# Patient Record
Sex: MALE | Race: WHITE | NOT HISPANIC OR LATINO | Employment: FULL TIME | URBAN - METROPOLITAN AREA
[De-identification: names, ages, dates, MRNs, and addresses within clinical notes are randomized per-mention and may not be internally consistent; named-entity substitution may affect disease eponyms.]

---

## 2021-10-06 ENCOUNTER — APPOINTMENT (OUTPATIENT)
Dept: RADIOLOGY | Facility: CLINIC | Age: 61
End: 2021-10-06
Payer: COMMERCIAL

## 2021-10-06 VITALS
BODY MASS INDEX: 27.92 KG/M2 | HEIGHT: 70 IN | SYSTOLIC BLOOD PRESSURE: 147 MMHG | HEART RATE: 74 BPM | WEIGHT: 195 LBS | DIASTOLIC BLOOD PRESSURE: 80 MMHG

## 2021-10-06 DIAGNOSIS — M51.16 LUMBAR DISC HERNIATION WITH RADICULOPATHY: ICD-10-CM

## 2021-10-06 DIAGNOSIS — M54.50 LOW BACK PAIN, UNSPECIFIED BACK PAIN LATERALITY, UNSPECIFIED CHRONICITY, UNSPECIFIED WHETHER SCIATICA PRESENT: ICD-10-CM

## 2021-10-06 DIAGNOSIS — M54.16 ACUTE RIGHT LUMBAR RADICULOPATHY: Primary | ICD-10-CM

## 2021-10-06 PROCEDURE — 99204 OFFICE O/P NEW MOD 45 MIN: CPT | Performed by: ORTHOPAEDIC SURGERY

## 2021-10-06 PROCEDURE — 72100 X-RAY EXAM L-S SPINE 2/3 VWS: CPT

## 2021-10-06 RX ORDER — ROSUVASTATIN CALCIUM 20 MG/1
20 TABLET, COATED ORAL
COMMUNITY
Start: 2021-09-24

## 2021-10-06 RX ORDER — ACETAMINOPHEN/DIPHENHYDRAMINE 500MG-25MG
81 TABLET ORAL DAILY
COMMUNITY
Start: 2021-09-24

## 2021-11-03 ENCOUNTER — APPOINTMENT (OUTPATIENT)
Dept: RADIOLOGY | Facility: CLINIC | Age: 61
End: 2021-11-03
Payer: COMMERCIAL

## 2021-11-03 VITALS — TEMPERATURE: 98.4 F

## 2021-11-03 DIAGNOSIS — M25.511 ACUTE PAIN OF RIGHT SHOULDER: ICD-10-CM

## 2021-11-03 DIAGNOSIS — M54.16 ACUTE RIGHT LUMBAR RADICULOPATHY: Primary | ICD-10-CM

## 2021-11-03 PROCEDURE — 99214 OFFICE O/P EST MOD 30 MIN: CPT | Performed by: ORTHOPAEDIC SURGERY

## 2021-11-03 PROCEDURE — 20610 DRAIN/INJ JOINT/BURSA W/O US: CPT | Performed by: ORTHOPAEDIC SURGERY

## 2021-11-03 PROCEDURE — 73030 X-RAY EXAM OF SHOULDER: CPT

## 2021-11-03 RX ORDER — PREDNISONE 20 MG/1
TABLET ORAL
Status: ON HOLD | COMMUNITY
Start: 2021-09-24 | End: 2021-11-18 | Stop reason: ALTCHOICE

## 2021-11-03 RX ORDER — DEXAMETHASONE SODIUM PHOSPHATE 100 MG/10ML
40 INJECTION INTRAMUSCULAR; INTRAVENOUS
Status: COMPLETED | OUTPATIENT
Start: 2021-11-03 | End: 2021-11-03

## 2021-11-03 RX ORDER — LIDOCAINE HYDROCHLORIDE 10 MG/ML
4 INJECTION, SOLUTION INFILTRATION; PERINEURAL
Status: COMPLETED | OUTPATIENT
Start: 2021-11-03 | End: 2021-11-03

## 2021-11-03 RX ADMIN — LIDOCAINE HYDROCHLORIDE 4 ML: 10 INJECTION, SOLUTION INFILTRATION; PERINEURAL at 15:32

## 2021-11-03 RX ADMIN — DEXAMETHASONE SODIUM PHOSPHATE 40 MG: 100 INJECTION INTRAMUSCULAR; INTRAVENOUS at 15:32

## 2021-11-10 VITALS
TEMPERATURE: 98 F | WEIGHT: 195 LBS | BODY MASS INDEX: 27.92 KG/M2 | HEART RATE: 76 BPM | DIASTOLIC BLOOD PRESSURE: 87 MMHG | SYSTOLIC BLOOD PRESSURE: 129 MMHG | HEIGHT: 70 IN

## 2021-11-10 DIAGNOSIS — S46.011A TRAUMATIC COMPLETE TEAR OF RIGHT ROTATOR CUFF, INITIAL ENCOUNTER: Primary | ICD-10-CM

## 2021-11-10 DIAGNOSIS — Z01.812 ENCOUNTER FOR PRE-OPERATIVE LABORATORY TESTING: ICD-10-CM

## 2021-11-10 PROCEDURE — 99214 OFFICE O/P EST MOD 30 MIN: CPT | Performed by: ORTHOPAEDIC SURGERY

## 2021-11-10 RX ORDER — TRAMADOL HYDROCHLORIDE 50 MG/1
50 TABLET ORAL EVERY 6 HOURS PRN
Status: CANCELLED | OUTPATIENT
Start: 2021-11-10

## 2021-11-12 ENCOUNTER — APPOINTMENT (OUTPATIENT)
Dept: LAB | Facility: HOSPITAL | Age: 61
End: 2021-11-12
Attending: ORTHOPAEDIC SURGERY
Payer: COMMERCIAL

## 2021-11-12 DIAGNOSIS — Z01.812 ENCOUNTER FOR PRE-OPERATIVE LABORATORY TESTING: ICD-10-CM

## 2021-11-12 DIAGNOSIS — S46.011A TRAUMATIC COMPLETE TEAR OF RIGHT ROTATOR CUFF, INITIAL ENCOUNTER: ICD-10-CM

## 2021-11-12 LAB
ANION GAP SERPL CALCULATED.3IONS-SCNC: 10 MMOL/L (ref 4–13)
APTT PPP: 28 SECONDS (ref 23–37)
ATRIAL RATE: 75 BPM
BASOPHILS # BLD AUTO: 0.06 THOUSANDS/ΜL (ref 0–0.1)
BASOPHILS NFR BLD AUTO: 1 % (ref 0–1)
BUN SERPL-MCNC: 16 MG/DL (ref 5–25)
CALCIUM SERPL-MCNC: 9.1 MG/DL (ref 8.3–10.1)
CHLORIDE SERPL-SCNC: 101 MMOL/L (ref 100–108)
CO2 SERPL-SCNC: 26 MMOL/L (ref 21–32)
CREAT SERPL-MCNC: 1.07 MG/DL (ref 0.6–1.3)
EOSINOPHIL # BLD AUTO: 0.13 THOUSAND/ΜL (ref 0–0.61)
EOSINOPHIL NFR BLD AUTO: 2 % (ref 0–6)
ERYTHROCYTE [DISTWIDTH] IN BLOOD BY AUTOMATED COUNT: 13 % (ref 11.6–15.1)
GFR SERPL CREATININE-BSD FRML MDRD: 76 ML/MIN/1.73SQ M
GLUCOSE P FAST SERPL-MCNC: 90 MG/DL (ref 65–99)
HCT VFR BLD AUTO: 47.5 % (ref 36.5–49.3)
HGB BLD-MCNC: 15.6 G/DL (ref 12–17)
IMM GRANULOCYTES # BLD AUTO: 0.03 THOUSAND/UL (ref 0–0.2)
IMM GRANULOCYTES NFR BLD AUTO: 0 % (ref 0–2)
INR PPP: 0.99 (ref 0.84–1.19)
LYMPHOCYTES # BLD AUTO: 2.6 THOUSANDS/ΜL (ref 0.6–4.47)
LYMPHOCYTES NFR BLD AUTO: 33 % (ref 14–44)
MCH RBC QN AUTO: 28.9 PG (ref 26.8–34.3)
MCHC RBC AUTO-ENTMCNC: 32.8 G/DL (ref 31.4–37.4)
MCV RBC AUTO: 88 FL (ref 82–98)
MONOCYTES # BLD AUTO: 0.68 THOUSAND/ΜL (ref 0.17–1.22)
MONOCYTES NFR BLD AUTO: 9 % (ref 4–12)
NEUTROPHILS # BLD AUTO: 4.43 THOUSANDS/ΜL (ref 1.85–7.62)
NEUTS SEG NFR BLD AUTO: 55 % (ref 43–75)
NRBC BLD AUTO-RTO: 0 /100 WBCS
P AXIS: 64 DEGREES
PLATELET # BLD AUTO: 212 THOUSANDS/UL (ref 149–390)
PMV BLD AUTO: 10.3 FL (ref 8.9–12.7)
POTASSIUM SERPL-SCNC: 3.9 MMOL/L (ref 3.5–5.3)
PR INTERVAL: 142 MS
PROTHROMBIN TIME: 12.9 SECONDS (ref 11.6–14.5)
QRS AXIS: 38 DEGREES
QRSD INTERVAL: 80 MS
QT INTERVAL: 356 MS
QTC INTERVAL: 397 MS
RBC # BLD AUTO: 5.39 MILLION/UL (ref 3.88–5.62)
SODIUM SERPL-SCNC: 137 MMOL/L (ref 136–145)
T WAVE AXIS: 42 DEGREES
VENTRICULAR RATE: 75 BPM
WBC # BLD AUTO: 7.93 THOUSAND/UL (ref 4.31–10.16)

## 2021-11-12 PROCEDURE — 85610 PROTHROMBIN TIME: CPT

## 2021-11-12 PROCEDURE — 80048 BASIC METABOLIC PNL TOTAL CA: CPT

## 2021-11-12 PROCEDURE — 93010 ELECTROCARDIOGRAM REPORT: CPT | Performed by: INTERNAL MEDICINE

## 2021-11-12 PROCEDURE — 85025 COMPLETE CBC W/AUTO DIFF WBC: CPT

## 2021-11-12 PROCEDURE — 93005 ELECTROCARDIOGRAM TRACING: CPT

## 2021-11-12 PROCEDURE — 36415 COLL VENOUS BLD VENIPUNCTURE: CPT

## 2021-11-12 PROCEDURE — 85730 THROMBOPLASTIN TIME PARTIAL: CPT

## 2021-11-16 RX ORDER — ACETAMINOPHEN 325 MG/1
650 TABLET ORAL EVERY 6 HOURS PRN
COMMUNITY
End: 2022-06-08

## 2021-11-17 ENCOUNTER — ANESTHESIA EVENT (OUTPATIENT)
Dept: PERIOP | Facility: HOSPITAL | Age: 61
End: 2021-11-17
Payer: COMMERCIAL

## 2021-11-18 ENCOUNTER — ANESTHESIA (OUTPATIENT)
Dept: PERIOP | Facility: HOSPITAL | Age: 61
End: 2021-11-18
Payer: COMMERCIAL

## 2021-11-18 ENCOUNTER — HOSPITAL ENCOUNTER (OUTPATIENT)
Facility: HOSPITAL | Age: 61
Setting detail: OUTPATIENT SURGERY
Discharge: HOME | End: 2021-11-18
Attending: ORTHOPAEDIC SURGERY | Admitting: ORTHOPAEDIC SURGERY
Payer: COMMERCIAL

## 2021-11-18 VITALS
BODY MASS INDEX: 28.07 KG/M2 | RESPIRATION RATE: 18 BRPM | WEIGHT: 195.6 LBS | HEART RATE: 72 BPM | TEMPERATURE: 97.2 F | DIASTOLIC BLOOD PRESSURE: 73 MMHG | SYSTOLIC BLOOD PRESSURE: 170 MMHG | OXYGEN SATURATION: 94 %

## 2021-11-18 DIAGNOSIS — Z98.890 S/P RIGHT ROTATOR CUFF REPAIR: Primary | ICD-10-CM

## 2021-11-18 PROBLEM — E78.5 HYPERLIPIDEMIA: Status: ACTIVE | Noted: 2021-11-18

## 2021-11-18 PROCEDURE — 29828 SHO ARTHRS SRG BICP TENODSIS: CPT | Performed by: PHYSICIAN ASSISTANT

## 2021-11-18 PROCEDURE — 29828 SHO ARTHRS SRG BICP TENODSIS: CPT | Performed by: ORTHOPAEDIC SURGERY

## 2021-11-18 PROCEDURE — 29826 SHO ARTHRS SRG DECOMPRESSION: CPT | Performed by: PHYSICIAN ASSISTANT

## 2021-11-18 PROCEDURE — C1713 ANCHOR/SCREW BN/BN,TIS/BN: HCPCS | Performed by: ORTHOPAEDIC SURGERY

## 2021-11-18 PROCEDURE — 29827 SHO ARTHRS SRG RT8TR CUF RPR: CPT | Performed by: ORTHOPAEDIC SURGERY

## 2021-11-18 PROCEDURE — 29826 SHO ARTHRS SRG DECOMPRESSION: CPT | Performed by: ORTHOPAEDIC SURGERY

## 2021-11-18 PROCEDURE — C9290 INJ, BUPIVACAINE LIPOSOME: HCPCS | Performed by: ANESTHESIOLOGY

## 2021-11-18 PROCEDURE — 29827 SHO ARTHRS SRG RT8TR CUF RPR: CPT | Performed by: PHYSICIAN ASSISTANT

## 2021-11-18 DEVICE — SPEEDBRG IMP SYS W/BIO-COMP SWVLK
Type: IMPLANTABLE DEVICE | Site: SHOULDER | Status: FUNCTIONAL
Brand: ARTHREX®

## 2021-11-18 DEVICE — BIO-COMP SWVLK C, CLD 4.75X19.1MM
Type: IMPLANTABLE DEVICE | Site: SHOULDER | Status: FUNCTIONAL
Brand: ARTHREX®

## 2021-11-18 RX ORDER — PROPOFOL 10 MG/ML
INJECTION, EMULSION INTRAVENOUS AS NEEDED
Status: DISCONTINUED | OUTPATIENT
Start: 2021-11-18 | End: 2021-11-18

## 2021-11-18 RX ORDER — LIDOCAINE HYDROCHLORIDE 10 MG/ML
INJECTION, SOLUTION EPIDURAL; INFILTRATION; INTRACAUDAL; PERINEURAL
Status: COMPLETED | OUTPATIENT
Start: 2021-11-18 | End: 2021-11-18

## 2021-11-18 RX ORDER — PROPOFOL 10 MG/ML
INJECTION, EMULSION INTRAVENOUS CONTINUOUS PRN
Status: DISCONTINUED | OUTPATIENT
Start: 2021-11-18 | End: 2021-11-18

## 2021-11-18 RX ORDER — SODIUM CHLORIDE, SODIUM LACTATE, POTASSIUM CHLORIDE, CALCIUM CHLORIDE 600; 310; 30; 20 MG/100ML; MG/100ML; MG/100ML; MG/100ML
INJECTION, SOLUTION INTRAVENOUS CONTINUOUS PRN
Status: DISCONTINUED | OUTPATIENT
Start: 2021-11-18 | End: 2021-11-18

## 2021-11-18 RX ORDER — BUPIVACAINE HYDROCHLORIDE 5 MG/ML
INJECTION, SOLUTION PERINEURAL
Status: COMPLETED | OUTPATIENT
Start: 2021-11-18 | End: 2021-11-18

## 2021-11-18 RX ORDER — FENTANYL CITRATE 50 UG/ML
INJECTION, SOLUTION INTRAMUSCULAR; INTRAVENOUS AS NEEDED
Status: DISCONTINUED | OUTPATIENT
Start: 2021-11-18 | End: 2021-11-18

## 2021-11-18 RX ORDER — CEFAZOLIN SODIUM 2 G/50ML
2000 SOLUTION INTRAVENOUS ONCE
Status: COMPLETED | OUTPATIENT
Start: 2021-11-18 | End: 2021-11-18

## 2021-11-18 RX ORDER — SODIUM CHLORIDE, SODIUM LACTATE, POTASSIUM CHLORIDE, CALCIUM CHLORIDE 600; 310; 30; 20 MG/100ML; MG/100ML; MG/100ML; MG/100ML
125 INJECTION, SOLUTION INTRAVENOUS CONTINUOUS
Status: DISCONTINUED | OUTPATIENT
Start: 2021-11-18 | End: 2021-11-18 | Stop reason: HOSPADM

## 2021-11-18 RX ORDER — ONDANSETRON 2 MG/ML
4 INJECTION INTRAMUSCULAR; INTRAVENOUS ONCE AS NEEDED
Status: DISCONTINUED | OUTPATIENT
Start: 2021-11-18 | End: 2021-11-18 | Stop reason: HOSPADM

## 2021-11-18 RX ORDER — MIDAZOLAM HYDROCHLORIDE 2 MG/2ML
INJECTION, SOLUTION INTRAMUSCULAR; INTRAVENOUS
Status: COMPLETED | OUTPATIENT
Start: 2021-11-18 | End: 2021-11-18

## 2021-11-18 RX ORDER — FENTANYL CITRATE/PF 50 MCG/ML
50 SYRINGE (ML) INJECTION
Status: DISCONTINUED | OUTPATIENT
Start: 2021-11-18 | End: 2021-11-18 | Stop reason: HOSPADM

## 2021-11-18 RX ORDER — DEXAMETHASONE SODIUM PHOSPHATE 10 MG/ML
INJECTION, SOLUTION INTRAMUSCULAR; INTRAVENOUS AS NEEDED
Status: DISCONTINUED | OUTPATIENT
Start: 2021-11-18 | End: 2021-11-18

## 2021-11-18 RX ORDER — SODIUM CHLORIDE, SODIUM LACTATE, POTASSIUM CHLORIDE, CALCIUM CHLORIDE 600; 310; 30; 20 MG/100ML; MG/100ML; MG/100ML; MG/100ML
100 INJECTION, SOLUTION INTRAVENOUS CONTINUOUS
Status: CANCELLED | OUTPATIENT
Start: 2021-11-18

## 2021-11-18 RX ORDER — OXYCODONE HYDROCHLORIDE 5 MG/1
5 TABLET ORAL EVERY 4 HOURS PRN
Qty: 15 TABLET | Refills: 0 | Status: SHIPPED | OUTPATIENT
Start: 2021-11-18 | End: 2022-01-10

## 2021-11-18 RX ORDER — ONDANSETRON 2 MG/ML
INJECTION INTRAMUSCULAR; INTRAVENOUS AS NEEDED
Status: DISCONTINUED | OUTPATIENT
Start: 2021-11-18 | End: 2021-11-18

## 2021-11-18 RX ADMIN — FENTANYL CITRATE 50 MCG: 50 INJECTION INTRAMUSCULAR; INTRAVENOUS at 11:06

## 2021-11-18 RX ADMIN — PROPOFOL 120 MCG/KG/MIN: 10 INJECTION, EMULSION INTRAVENOUS at 09:20

## 2021-11-18 RX ADMIN — MIDAZOLAM 2 MG: 1 INJECTION INTRAMUSCULAR; INTRAVENOUS at 08:45

## 2021-11-18 RX ADMIN — DEXAMETHASONE SODIUM PHOSPHATE 4 MG: 10 INJECTION, SOLUTION INTRAMUSCULAR; INTRAVENOUS at 09:31

## 2021-11-18 RX ADMIN — ONDANSETRON 4 MG: 2 INJECTION INTRAMUSCULAR; INTRAVENOUS at 09:31

## 2021-11-18 RX ADMIN — FENTANYL CITRATE 50 MCG: 50 INJECTION, SOLUTION INTRAMUSCULAR; INTRAVENOUS at 09:24

## 2021-11-18 RX ADMIN — CEFAZOLIN SODIUM 2000 MG: 2 SOLUTION INTRAVENOUS at 09:17

## 2021-11-18 RX ADMIN — FENTANYL CITRATE 50 MCG: 50 INJECTION, SOLUTION INTRAMUSCULAR; INTRAVENOUS at 09:52

## 2021-11-18 RX ADMIN — BUPIVACAINE 10 ML: 13.3 INJECTION, SUSPENSION, LIPOSOMAL INFILTRATION at 08:45

## 2021-11-18 RX ADMIN — FENTANYL CITRATE 50 MCG: 50 INJECTION, SOLUTION INTRAMUSCULAR; INTRAVENOUS at 09:31

## 2021-11-18 RX ADMIN — BUPIVACAINE HYDROCHLORIDE 15 ML: 5 INJECTION, SOLUTION PERINEURAL at 08:45

## 2021-11-18 RX ADMIN — SODIUM CHLORIDE, SODIUM LACTATE, POTASSIUM CHLORIDE, AND CALCIUM CHLORIDE: .6; .31; .03; .02 INJECTION, SOLUTION INTRAVENOUS at 09:17

## 2021-11-18 RX ADMIN — LIDOCAINE HYDROCHLORIDE 1 ML: 10 INJECTION, SOLUTION EPIDURAL; INFILTRATION; INTRACAUDAL; PERINEURAL at 08:45

## 2021-11-18 RX ADMIN — PROPOFOL 50 MG: 10 INJECTION, EMULSION INTRAVENOUS at 09:20

## 2021-11-29 DIAGNOSIS — Z98.890 STATUS POST RIGHT ROTATOR CUFF REPAIR: Primary | ICD-10-CM

## 2021-11-29 PROCEDURE — 99024 POSTOP FOLLOW-UP VISIT: CPT | Performed by: ORTHOPAEDIC SURGERY

## 2021-11-29 RX ORDER — METHOCARBAMOL 750 MG/1
750 TABLET, FILM COATED ORAL AS NEEDED
COMMUNITY
Start: 2021-11-06 | End: 2022-06-08

## 2022-01-10 DIAGNOSIS — Z98.890 STATUS POST RIGHT ROTATOR CUFF REPAIR: Primary | ICD-10-CM

## 2022-01-10 PROCEDURE — 99024 POSTOP FOLLOW-UP VISIT: CPT | Performed by: ORTHOPAEDIC SURGERY

## 2022-01-10 NOTE — LETTER
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

## 2022-01-10 NOTE — PROGRESS NOTES
Assessment/Plan:  1  Status post right rotator cuff repair         Scribe Attestation    I,:  Sam Preciado am acting as a scribe while in the presence of the attending physician :       I,:  Preeti Marcelo MD personally performed the services described in this documentation    as scribed in my presence :             Upon repeat evaluation of his right shoulder, I am happy with Jonh's progress 7 weeks and 4 days status post right shoulder rotator cuff repair, biceps tenodesis and subacromial decompression  I discussed with Jonh that at this time he may begin to completely wean out of his right shoulder sling over the next week  I encouraged Jonh that if it is icy outside or feels that he is at risk for a fall, then he may wear the sling for additional protection  I discussed with Jonh that the right shoulder soreness that he has been experiencing is normal and he should anticipate soreness with his right shoulder as he continues to progress through physical therapy  I discussed with Jonh that he may begin to perform activities such as putting on a jacket, shoes, and socks, as well as washing his opposite underarm  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, 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, but he is not to return to activities in the field at this time  I provided Jonh with a work note stating this today  I will follow up with [facility] again in 6 weeks for repeat evaluation  Subjective:   Austin Esteban is a 63 y o  male who presents to the office today for repeat evaluation of his right shoulder 7 weeks and 4 days status post right shoulder arthroscopy rotator cuff repair, biceps tenodesis, and subacromial decompression  [facility] states that since his last visit, he has been attending physical therapy and states that he has been experiencing some soreness and discomfort after attending therapy   He does not report any instance of injury to his right shoulder and denies any distal paresthesias  Chip states that he would like to consider being able to perform light physical activities with his right shoulder such as washing himself and tying his shoes  Review of Systems      Past Medical History:   Diagnosis Date    Hyperlipidemia     Shoulder abrasion     injured at work  11/6/21       Past Surgical History:   Procedure Laterality Date    CARDIAC SURGERY      had a cardiac cath done 5/2021 at [facility]- showed " slight " blockage    MOHS SURGERY      basal cell ca of nose    MOUTH SURGERY      NERVE BLOCK Right 10/14/2016    Procedure: LUMBAR TRANSFORAMINAL EPIDURAL L4 AND L5;  Surgeon: Beth Farias MD;  Location: [place] [address] MAIN OR;  Service:    Sammy Abrams [address] ARTHROSCOP,SURG,W/ROTAT CUFF REPR Right 11/18/2021    Procedure: REPAIR ROTATOR CUFF  ARTHROSCOPIC, BICEPS TENODESIS, SUBACROMIAL DECOMPRESSION;  Surgeon: Fernando Rojas MD;  Location: [address] [place] [address];  Service: Orthopedics       History reviewed  No pertinent family history      Social History     Occupational History    Not on file   Tobacco Use    Smoking status: Former Smoker    Smokeless tobacco: Current User    Tobacco comment: Vape   Substance and Sexual Activity    Alcohol use: No    Drug use: No    Sexual activity: Not on file         Current Outpatient Medications:     acetaminophen (TYLENOL) 325 mg tablet, Take 650 mg by mouth every 6 (six) hours as needed for mild pain, Disp: , Rfl:     methocarbamol (ROBAXIN) 750 mg tablet, Take 750 mg by mouth as needed, Disp: , Rfl:     multivitamin-iron-minerals-folic acid (CENTRUM) chewable tablet, Chew 1 tablet daily, Disp: , Rfl:     mupirocin (BACTROBAN) 2 % ointment, APPLY TO NAIL FOLD AS DIRECTED, Disp: , Rfl:     naproxen (NAPROSYN) 500 mg tablet, Take 1 tablet (500 mg total) by mouth 2 (two) times a day with meals, Disp: 60 tablet, Rfl: 0    RA Aspirin EC 81 MG EC tablet, Take 81 mg by mouth daily Last dose 11/9/21 , Disp: , Rfl:    rosuvastatin (CRESTOR) 20 MG tablet, Take 20 mg by mouth daily at bedtime, Disp: , Rfl:     No Known Allergies    Objective: There were no vitals filed for this visit  Right Shoulder Exam     Range of Motion   External rotation: 30   Forward flexion: 60   Internal rotation 90 degrees: 30     Other   Erythema: absent  Scars: present  Sensation: normal  Pulse: present    Comments:  Operative incision site does not demonstrate any obvious signs of infection                Physical Exam

## 2022-02-21 VITALS
BODY MASS INDEX: 28.63 KG/M2 | WEIGHT: 200 LBS | SYSTOLIC BLOOD PRESSURE: 149 MMHG | HEIGHT: 70 IN | HEART RATE: 92 BPM | DIASTOLIC BLOOD PRESSURE: 66 MMHG

## 2022-02-21 DIAGNOSIS — Z98.890 STATUS POST RIGHT ROTATOR CUFF REPAIR: Primary | ICD-10-CM

## 2022-02-21 PROCEDURE — 99213 OFFICE O/P EST LOW 20 MIN: CPT | Performed by: PHYSICIAN ASSISTANT

## 2022-02-24 NOTE — TELEPHONE ENCOUNTER
Please fax an updated Physical therapy script to Maynor Simmons at [employer] to [phone]
Thank you
This has been faxed 
see below

## 2022-02-24 NOTE — TELEPHONE ENCOUNTER
Work note is done, I called Joyce Barron from [employer]  [facility] asking for her fax number

## 2022-02-24 NOTE — TELEPHONE ENCOUNTER
[employer] needs an updated work letter for patient  Is he returning with limitation or when is he returning?

## 2022-02-25 NOTE — TELEPHONE ENCOUNTER
Tripp Herbert called back with fax [phone]  I faxed letter in and obtained confirmation it went through successfully

## 2022-04-08 VITALS — TEMPERATURE: 97.6 F | WEIGHT: 200 LBS | BODY MASS INDEX: 28.63 KG/M2 | HEIGHT: 70 IN

## 2022-04-08 DIAGNOSIS — Z98.890 STATUS POST RIGHT ROTATOR CUFF REPAIR: Primary | ICD-10-CM

## 2022-04-08 PROCEDURE — 99213 OFFICE O/P EST LOW 20 MIN: CPT | Performed by: ORTHOPAEDIC SURGERY

## 2022-04-08 NOTE — PROGRESS NOTES
Assessment/Plan:  1  Status post right rotator cuff repair  Ambulatory Referral to Physical Therapy       Scribe Attestation    I,:  Jonathan Ignacio am acting as a scribe while in the presence of the attending physician :       I,:  Yeny Dietz MD personally performed the services described in this documentation    as scribed in my presence :             Overall Chip is doing well  He is aprox  5 months s/p right shoulder arthroscopic rotator cuff repair with biceps tenodesis, DOS 11/18/21  Advised to continue PT to work on strengthening exercises, updated script was provided  Work restrictions were increased, no lifting greater then 20 lbs, note was provided  Follow up in 4 weeks time for re-evaluation  Subjective:   Janina Martin is a 63 y o  male who presents to the office today for a follow up regarding his right shoulder  He is aprox  5 months s/p right shoulder arthroscopic rotator cuff repair and biceps tenodesis, DOS 11/18/21  Overall Chip is doing well  He is attending formal PT 1x a week and performing a HEP  Chip notes some residual stiffness  He notes shoulder pain occurs mainly at night  He is still having a difficult time sleeping  Chip will take Tylenol or OTC NSAID's on an as needed basis  He has returned back to work with no lifting, mainly sedentary/computer work  Review of Systems   Constitutional: Negative for chills, fever and unexpected weight change  HENT: Negative for hearing loss, nosebleeds and sore throat  Eyes: Negative for pain, redness and visual disturbance  Respiratory: Negative for cough, shortness of breath and wheezing  Cardiovascular: Negative for chest pain, palpitations and leg swelling  Gastrointestinal: Negative for abdominal pain, nausea and vomiting  Endocrine: Negative for polydipsia and polyuria  Genitourinary: Negative for difficulty urinating and hematuria  Musculoskeletal: Negative for arthralgias, joint swelling and myalgias  Skin: Negative for rash and wound  Neurological: Negative for dizziness, numbness and headaches  Psychiatric/Behavioral: Negative for decreased concentration, dysphoric mood and suicidal ideas  The patient is not nervous/anxious  Past Medical History:   Diagnosis Date    Hyperlipidemia     Shoulder abrasion     injured at work  11/6/21       Past Surgical History:   Procedure Laterality Date    CARDIAC SURGERY      had a cardiac cath done [facility]/2021 at [facility]- showed " slight " blockage    MOHS SURGERY      basal cell ca of nose    MOUTH SURGERY      NERVE BLOCK Right 10/14/2016    Procedure: LUMBAR TRANSFORAMINAL EPIDURAL L4 AND L5;  Surgeon: Derek Villa MD;  Location: [facility] MAIN OR;  Service:    Charon Hodgkins [address] [facility] ARTHROSCOP,SURG,W/ROTAT CUFF REPR Right 11/18/2021    Procedure: REPAIR ROTATOR CUFF  ARTHROSCOPIC, BICEPS TENODESIS, SUBACROMIAL DECOMPRESSION;  Surgeon: Chris Durant MD;  Location: [place] MAIN OR;  Service: Orthopedics       No family history on file      Social History     Occupational History    Not on file   Tobacco Use    Smoking status: Former Smoker    Smokeless tobacco: Current User    Tobacco comment: Vape   Substance and Sexual Activity    Alcohol use: No    Drug use: No    Sexual activity: Not on file         Current Outpatient Medications:     acetaminophen (TYLENOL) 325 mg tablet, Take 650 mg by mouth every 6 (six) hours as needed for mild pain, Disp: , Rfl:     methocarbamol (ROBAXIN) 750 mg tablet, Take 750 mg by mouth as needed, Disp: , Rfl:     multivitamin-iron-minerals-folic acid (CENTRUM) chewable tablet, Chew 1 tablet daily, Disp: , Rfl:     naproxen (NAPROSYN) 500 mg tablet, Take 1 tablet (500 mg total) by mouth 2 (two) times a day with meals, Disp: 60 tablet, Rfl: 0    RA Aspirin EC 81 MG EC tablet, Take 81 mg by mouth daily Last dose 11/9/21 , Disp: , Rfl:     rosuvastatin (CRESTOR) 20 MG tablet, Take 20 mg by mouth daily at bedtime, Disp: , Rfl:     No Known Allergies    Objective:  Vitals:    04/08/22 1529   Temp: 97 6 °F (36 4 °C)       Right Shoulder Exam     Tenderness   The patient is experiencing no tenderness  Range of Motion   Active abduction: 140   External rotation: 60   Internal rotation 0 degrees: L4     Muscle Strength   Internal rotation: 5/5   External rotation: 5/5     Other   Erythema: absent  Scars: present  Sensation: normal  Pulse: present    Comments:  Abduction strength 4+/5            Physical Exam  Vitals and nursing note reviewed  Constitutional:       Appearance: He is well-developed  Eyes:      Conjunctiva/sclera: Conjunctivae normal       Pupils: Pupils are equal, round, and reactive to light  Pulmonary:      Effort: Pulmonary effort is normal       Breath sounds: Normal breath sounds  Skin:     General: Skin is warm and dry  Neurological:      Mental Status: He is alert and oriented to person, place, and time  Psychiatric:         Behavior: Behavior normal          I have personally reviewed pertinent films in PACS and my interpretation is as follows:   No new imaging to review

## 2022-04-08 NOTE — LETTER
April 8, 2022     Patient: Tomi Giraldo   YOB: 1960   Date of Visit: 4/8/2022       To Whom it May Concern:    Tomi Giraldo is under my professional care  He was seen in my office on 4/8/2022  He may return to work with limitation  No lifting greater then 20 lbs  He will be re-evaluated in 4 weeks time  If you have any questions or concerns, please don't hesitate to call           Sincerely,          Jose Landry MD

## 2022-04-19 NOTE — TELEPHONE ENCOUNTER
Patient sees Dr Denzil Primrose Tiajuana Dolin the nurse  from [facility] is calling in wanting to know if the patient was seen in the office on 4/8  Wanted to get the office notes from 4/8 faxed over to her at [phone]  Also wanted to know if the patient has any upcoming appointments scheduled

## 2022-06-03 NOTE — TELEPHONE ENCOUNTER
Patient called to confirm appointment details on 6/8  He couldn't remember the date  Appointment had already been confirmed on appointment desk

## 2022-06-08 VITALS
HEART RATE: 69 BPM | SYSTOLIC BLOOD PRESSURE: 128 MMHG | HEIGHT: 70 IN | WEIGHT: 202.6 LBS | DIASTOLIC BLOOD PRESSURE: 70 MMHG | BODY MASS INDEX: 29.01 KG/M2

## 2022-06-08 DIAGNOSIS — Z98.890 STATUS POST RIGHT ROTATOR CUFF REPAIR: Primary | ICD-10-CM

## 2022-06-08 PROCEDURE — 99213 OFFICE O/P EST LOW 20 MIN: CPT | Performed by: ORTHOPAEDIC SURGERY

## 2022-06-08 NOTE — PROGRESS NOTES
Assessment/Plan:  1  Status post right rotator cuff repair         Scribe Attestation    I,:  Jonathon Epley am acting as a scribe while in the presence of the attending physician :       I,:  Juana Clemens MD personally performed the services described in this documentation    as scribed in my presence :             Upon repeat evaluation of Jonh's right shoulder, he continues to demonstrate improvement about his right shoulder 6 months and 21 days status post right shoulder arthroscopy rotator cuff repair, biceps tenodesis, and subacromial decompression  I discussed with Jonh that at this time he should continue may begin to perform a dumbbell strengthening regimen for continued improvement of his right shoulder  I encouraged him to avoid performing fly exercises and to continue with using bands for strengthening and he may perform push ups as tolerated  I discussed with him that he will need to slowly build his strength over weeks to months to continue improving his right shoulder strength  I discussed with him that in regard to his work he may return to work with a 40 pound lifting restriction over the next month  On 7/9/22, Jonh may return to full activity with no restrictions  I will follow up with Jonh again as needed for repeat clinical evaluation of his right shoulder  Subjective:   Amarilis Marcum is a 63 y o  male who presents to the office today for repeat clinical evaluation of his right shoulder 6 months and 21 days status post right shoulder arthroscopy rotator cuff repair, biceps tenodesis, and subacromial decompression  Jonh states that he has been continuing to perform his home exercise program and has experienced continued improvement about his right shoulder  He states that he still continues to experience some weakness about his right shoulder and notes increased soreness at the end of the day after performing physical activities    He states that he has been starting to incorporate increased activities at work, but has been cautious with the limit of his shoulder function  He has not experienced any new injury about his right shoulder  Review of Systems   Constitutional: Negative for chills and fever  HENT: Negative for ear pain and sore throat  Eyes: Negative for pain and visual disturbance  Respiratory: Negative for cough and shortness of breath  Cardiovascular: Negative for chest pain and palpitations  Gastrointestinal: Negative for abdominal pain and vomiting  Genitourinary: Negative for dysuria and hematuria  Musculoskeletal: Negative for back pain  Skin: Negative for color change and rash  Neurological: Negative for seizures and syncope  All other systems reviewed and are negative  Past Medical History:   Diagnosis Date    Hyperlipidemia     Shoulder abrasion     injured at work  11/6/21       Past Surgical History:   Procedure Laterality Date    CARDIAC SURGERY      had a cardiac cath done [facility]/2021 at [facility]- showed " slight " blockage    MOHS SURGERY      basal cell ca of nose    MOUTH SURGERY      NERVE BLOCK Right 10/14/2016    Procedure: LUMBAR TRANSFORAMINAL EPIDURAL L4 AND L5;  Surgeon: Renita Huynh MD;  Location: [address];  Service:    [address] ARTHROSCOP,SURG,W/ROTAT CUFF REPR Right 11/18/2021    Procedure: REPAIR ROTATOR CUFF  ARTHROSCOPIC, BICEPS TENODESIS, SUBACROMIAL DECOMPRESSION;  Surgeon: Nydia Mcleod MD;  Location: [address] [place] [address];  Service: Orthopedics       History reviewed  No pertinent family history      Social History     Occupational History    Not on file   Tobacco Use    Smoking status: Former Smoker    Smokeless tobacco: Current User    Tobacco comment: Vape   Substance and Sexual Activity    Alcohol use: No    Drug use: No    Sexual activity: Not on file         Current Outpatient Medications:     multivitamin-iron-minerals-folic acid (CENTRUM) chewable tablet, Chew 1 tablet daily, Disp: , Rfl:     RA Aspirin EC 81 MG EC tablet, Take 81 mg by mouth daily Last dose 11/9/21 , Disp: , Rfl:     rosuvastatin (CRESTOR) 20 MG tablet, Take 20 mg by mouth daily at bedtime, Disp: , Rfl:     No Known Allergies    Objective:  Vitals:    06/08/22 1509   BP: 128/70   Pulse: 69       Right Shoulder Exam     Range of Motion   Active abduction: 140   External rotation: 70   Internal rotation 0 degrees: L4     Muscle Strength   Right shoulder normal muscle strength: Abduction: 4+/5, biceps: 4+/5  Internal rotation: 5/5   External rotation: 5/5     Tests   Impingement: negative    Other   Erythema: absent  Scars: present  Sensation: normal            Physical Exam  HENT:      Head: Normocephalic and atraumatic  Eyes:      General:         Right eye: No discharge  Left eye: No discharge  Extraocular Movements: Extraocular movements intact  Conjunctiva/sclera: Conjunctivae normal    Cardiovascular:      Rate and Rhythm: Normal rate  Pulmonary:      Effort: Pulmonary effort is normal  No respiratory distress  Musculoskeletal:      Cervical back: Normal range of motion and neck supple  Skin:     General: Skin is warm and dry  Neurological:      Mental Status: He is alert and oriented to person, place, and time     Psychiatric:         Mood and Affect: Mood normal          Behavior: Behavior normal

## 2022-06-08 NOTE — LETTER
June 8, 2022     Patient: Queta Carrion  YOB: 1960  Date of Visit: 6/8/2022      To Whom it May Concern:    Queta Carrion is under my professional care  Luci Perdomo was seen in my office on 6/8/2022  Luci Perdomo may return to work on 6/9/22 with a lifting restriction of 40 pounds  On or about 7/9/22, be may return to full activity with no limitations  If you have any questions or concerns, please don't hesitate to call           Sincerely,          Salomón Gould MD        CC: No Recipients

## 2022-06-17 ENCOUNTER — TELEPHONE (OUTPATIENT)
Dept: OBGYN CLINIC | Facility: HOSPITAL | Age: 62
End: 2022-06-17

## 2023-03-27 ENCOUNTER — OFFICE VISIT (OUTPATIENT)
Dept: OBGYN CLINIC | Facility: CLINIC | Age: 63
End: 2023-03-27

## 2023-03-27 ENCOUNTER — HOSPITAL ENCOUNTER (OUTPATIENT)
Dept: MRI IMAGING | Facility: HOSPITAL | Age: 63
Discharge: HOME | End: 2023-03-27

## 2023-03-27 VITALS
SYSTOLIC BLOOD PRESSURE: 139 MMHG | HEART RATE: 87 BPM | DIASTOLIC BLOOD PRESSURE: 80 MMHG | HEIGHT: 70 IN | BODY MASS INDEX: 28.92 KG/M2 | WEIGHT: 202 LBS

## 2023-03-27 DIAGNOSIS — M23.92 INTERNAL DERANGEMENT OF LEFT KNEE: Primary | ICD-10-CM

## 2023-03-27 DIAGNOSIS — M23.92 INTERNAL DERANGEMENT OF LEFT KNEE: ICD-10-CM

## 2023-03-27 RX ORDER — NAPROXEN 500 MG/1
500 TABLET ORAL 2 TIMES DAILY
COMMUNITY
Start: 2023-03-25 | End: 2023-04-04

## 2023-03-27 NOTE — PROGRESS NOTES
Assessment/Plan:  1  Internal derangement of left knee  MRI knee left wo contrast          Mari Davis has left-sided knee pain after an acute injury 2 days ago  He has significant pain and inability to straight leg raise today and I am concerned for possible quadriceps tendon injury given the severity of his pain and negative x-ray  They did make a comment on his x-ray of possible fragmentation in the quadriceps region on x-ray  I cannot visualize his x-ray today in the office  I recommended a stat MRI given his severity of pain and inability to straight leg raise to rule out quadriceps tendon injury  He also has a large knee effusion and there are several other reasons he could have intra-articular injury causing the severe pain  He will remain on crutches and in the knee immobilizer until further notice  I will direct his care following his MRI and discuss appropriate follow-up and location at that time  Subjective:   Bryant Hong is a 63 y o  male who presents to the office for evaluation for an acute left knee injury  He had a traumatic injury to the left knee 2 days ago  He was carrying a heavy box down stairs and his ankle rolled and his knee buckled and bent awkwardly  He had significant pain and discomfort in the left knee and could not weight-bear  He went to the emergency room at [facility] and had an x-ray which was negative for any acute fracture  He was placed in a knee immobilizer and given crutches  He has pain that is aching throbbing and constant today  He has difficulty lifting his left leg into the air  He denies any history of knee injury in the past   He denies any rating pain or numbness or tingling down his legs  Review of Systems   Constitutional: Negative for chills, fever and unexpected weight change  HENT: Negative for hearing loss, nosebleeds and sore throat  Eyes: Negative for pain, redness and visual disturbance     Respiratory: Negative for "cough, shortness of breath and wheezing  Cardiovascular: Negative for chest pain, palpitations and leg swelling  Gastrointestinal: Negative for abdominal pain, nausea and vomiting  Endocrine: Negative for polyphagia and polyuria  Genitourinary: Negative for dysuria and hematuria  Musculoskeletal:        See HPI   Skin: Negative for rash and wound  Neurological: Negative for dizziness, numbness and headaches  Psychiatric/Behavioral: Negative for decreased concentration and suicidal ideas  The patient is not nervous/anxious  Past Medical History:   Diagnosis Date   • Hyperlipidemia    • Shoulder abrasion     injured at work  11/6/21       Past Surgical History:   Procedure Laterality Date   • CARDIAC SURGERY      had a cardiac cath done 5/2021 at [facility]- showed \" slight \" blockage   • MOHS SURGERY      basal cell ca of nose   • MOUTH SURGERY     • NERVE BLOCK Right 10/14/2016    Procedure: LUMBAR TRANSFORAMINAL EPIDURAL L4 AND L5;  Surgeon: Tera Yuen MD;  Location: [facility] MAIN OR;  Service:    • OR SURGICAL ARTHROSCOPY SHOULDER W/ROTATOR CUFF RPR Right 11/18/2021    Procedure: REPAIR ROTATOR CUFF  ARTHROSCOPIC, BICEPS TENODESIS, SUBACROMIAL DECOMPRESSION;  Surgeon: Óscar Li MD;  Location: [address] [place] [address];  Service: Orthopedics       History reviewed  No pertinent family history      Social History     Occupational History   • Not on file   Tobacco Use   • Smoking status: Former   • Smokeless tobacco: Current   • Tobacco comments:     Vape   Substance and Sexual Activity   • Alcohol use: No   • Drug use: No   • Sexual activity: Not on file         Current Outpatient Medications:   •  multivitamin-iron-minerals-folic acid (CENTRUM) chewable tablet, Chew 1 tablet daily, Disp: , Rfl:   •  naproxen (NAPROSYN) 500 mg tablet, Take 500 mg by mouth 2 (two) times a day, Disp: , Rfl:   •  RA Aspirin EC 81 MG EC tablet, Take 81 mg by mouth daily Last dose 11/9/21 , Disp: , Rfl:   •  " rosuvastatin (CRESTOR) 20 MG tablet, Take 20 mg by mouth daily at bedtime, Disp: , Rfl:     No Known Allergies    Objective:  Vitals:    03/27/23 1024   BP: 139/80   Pulse: 87       Left Knee Exam     Tenderness   The patient is experiencing tenderness in the patella (Severe tenderness along quadriceps insertion, no medial or lateral joint line tenderness)  Range of Motion   Extension: normal   Flexion:  80 abnormal     Tests   Alexx:  Medial - positive Lateral - positive    Other   Erythema: absent  Sensation: normal  Pulse: present  Swelling: severe  Effusion: effusion present    Comments:  Unable to straight leg raise          Observations   Left Knee   Positive for effusion  Physical Exam  Vitals and nursing note reviewed  Constitutional:       Appearance: Normal appearance  He is well-developed  HENT:      Head: Normocephalic and atraumatic  Right Ear: External ear normal       Left Ear: External ear normal    Eyes:      General: No scleral icterus  Extraocular Movements: Extraocular movements intact  Conjunctiva/sclera: Conjunctivae normal    Cardiovascular:      Rate and Rhythm: Normal rate  Pulmonary:      Effort: Pulmonary effort is normal  No respiratory distress  Musculoskeletal:      Cervical back: Normal range of motion and neck supple  Left knee: Effusion present  Instability Tests: Medial Alexx test positive and lateral Alexx test positive  Comments: See Ortho exam   Skin:     General: Skin is warm and dry  Neurological:      Mental Status: He is alert and oriented to person, place, and time  Psychiatric:         Behavior: Behavior normal          I have personally reviewed pertinent films in PACS and my interpretation is as follows: No available images in the office today  X-rays of the left knee from [facility] 3/25/2023 demonstrate no acute abnormality    There is reported ossific densities in the quadriceps tendon region      This document was created using speech voice recognition software  Grammatical errors, random word insertions, pronoun errors, and incomplete sentences are an occasional consequence of this system due to software limitations, ambient noise, and hardware issues  Any formal questions or concerns about content, text, or information contained within the body of this dictation should be directly addressed to the provider for clarification

## 2023-03-28 ENCOUNTER — TELEPHONE (OUTPATIENT)
Dept: OBGYN CLINIC | Facility: CLINIC | Age: 63
End: 2023-03-28

## 2023-03-28 ENCOUNTER — PREP FOR PROCEDURE (OUTPATIENT)
Dept: OBGYN CLINIC | Facility: CLINIC | Age: 63
End: 2023-03-28

## 2023-03-28 ENCOUNTER — TELEPHONE (OUTPATIENT)
Dept: OBGYN CLINIC | Facility: HOSPITAL | Age: 63
End: 2023-03-28

## 2023-03-28 DIAGNOSIS — S76.112A QUADRICEPS TENDON RUPTURE, LEFT, INITIAL ENCOUNTER: Primary | ICD-10-CM

## 2023-03-28 RX ORDER — CEFAZOLIN SODIUM 2 G/50ML
2000 SOLUTION INTRAVENOUS ONCE
Status: CANCELLED | OUTPATIENT
Start: 2023-03-28 | End: 2023-03-28

## 2023-03-28 RX ORDER — CHLORHEXIDINE GLUCONATE 0.12 MG/ML
15 RINSE ORAL ONCE
Status: CANCELLED | OUTPATIENT
Start: 2023-03-28 | End: 2023-03-28

## 2023-03-28 NOTE — TELEPHONE ENCOUNTER
Called patient informed that pt will need labs and EKG prior to appt on Thursday per Dr Guerrero Arevalo  We dicussed SX prep  Pt verbalized understanding

## 2023-03-28 NOTE — TELEPHONE ENCOUNTER
Caller: Patient    Doctor: Judit Bro    Reason for call: Patient calling regarding the MRI results    Call back#: [phone]

## 2023-03-28 NOTE — TELEPHONE ENCOUNTER
----- Message from Ruperto Dinero MD sent at 3/28/2023 10:55 AM EDT -----  Spencer Donovan there! This lopez saw [employer] and has a quad tendon rupture  I have put in the prep for procedure/booking to get him repaired on Friday  I think Franco Ayala or [employer] Forte were going to call him to get him back in to see me Thursday to discuss and get consented etc  But can get him on the OR board and also ordered him labs and EKG    Thanks!   Maritza Rodrigues

## 2023-03-29 ENCOUNTER — APPOINTMENT (OUTPATIENT)
Dept: LAB | Facility: HOSPITAL | Age: 63
End: 2023-03-29
Attending: ORTHOPAEDIC SURGERY

## 2023-03-29 ENCOUNTER — ANESTHESIA EVENT (OUTPATIENT)
Dept: PERIOP | Facility: HOSPITAL | Age: 63
End: 2023-03-29

## 2023-03-29 DIAGNOSIS — S76.112A QUADRICEPS TENDON RUPTURE, LEFT, INITIAL ENCOUNTER: ICD-10-CM

## 2023-03-29 LAB
ANION GAP SERPL CALCULATED.3IONS-SCNC: 7 MMOL/L (ref 4–13)
BASOPHILS # BLD AUTO: 0.07 THOUSANDS/ÂΜL (ref 0–0.1)
BASOPHILS NFR BLD AUTO: 1 % (ref 0–1)
BUN SERPL-MCNC: 18 MG/DL (ref 5–25)
CALCIUM SERPL-MCNC: 8.8 MG/DL (ref 8.4–10.2)
CHLORIDE SERPL-SCNC: 106 MMOL/L (ref 96–108)
CO2 SERPL-SCNC: 27 MMOL/L (ref 21–32)
CREAT SERPL-MCNC: 0.91 MG/DL (ref 0.6–1.3)
EOSINOPHIL # BLD AUTO: 0.17 THOUSAND/ÂΜL (ref 0–0.61)
EOSINOPHIL NFR BLD AUTO: 3 % (ref 0–6)
ERYTHROCYTE [DISTWIDTH] IN BLOOD BY AUTOMATED COUNT: 13.2 % (ref 11.6–15.1)
GFR SERPL CREATININE-BSD FRML MDRD: 91 ML/MIN/1.73SQ M
GLUCOSE P FAST SERPL-MCNC: 113 MG/DL (ref 65–99)
HCT VFR BLD AUTO: 45.9 % (ref 36.5–49.3)
HGB BLD-MCNC: 15.2 G/DL (ref 12–17)
IMM GRANULOCYTES # BLD AUTO: 0.02 THOUSAND/UL (ref 0–0.2)
IMM GRANULOCYTES NFR BLD AUTO: 0 % (ref 0–2)
LYMPHOCYTES # BLD AUTO: 1.56 THOUSANDS/ÂΜL (ref 0.6–4.47)
LYMPHOCYTES NFR BLD AUTO: 25 % (ref 14–44)
MCH RBC QN AUTO: 29.2 PG (ref 26.8–34.3)
MCHC RBC AUTO-ENTMCNC: 33.1 G/DL (ref 31.4–37.4)
MCV RBC AUTO: 88 FL (ref 82–98)
MONOCYTES # BLD AUTO: 0.56 THOUSAND/ÂΜL (ref 0.17–1.22)
MONOCYTES NFR BLD AUTO: 9 % (ref 4–12)
NEUTROPHILS # BLD AUTO: 3.89 THOUSANDS/ÂΜL (ref 1.85–7.62)
NEUTS SEG NFR BLD AUTO: 62 % (ref 43–75)
NRBC BLD AUTO-RTO: 0 /100 WBCS
PLATELET # BLD AUTO: 192 THOUSANDS/UL (ref 149–390)
PMV BLD AUTO: 10.2 FL (ref 8.9–12.7)
POTASSIUM SERPL-SCNC: 4.6 MMOL/L (ref 3.5–5.3)
RBC # BLD AUTO: 5.2 MILLION/UL (ref 3.88–5.62)
SODIUM SERPL-SCNC: 140 MMOL/L (ref 135–147)
WBC # BLD AUTO: 6.27 THOUSAND/UL (ref 4.31–10.16)

## 2023-03-30 ENCOUNTER — OFFICE VISIT (OUTPATIENT)
Dept: OBGYN CLINIC | Facility: CLINIC | Age: 63
End: 2023-03-30

## 2023-03-30 VITALS
HEIGHT: 70 IN | TEMPERATURE: 97.8 F | WEIGHT: 202 LBS | DIASTOLIC BLOOD PRESSURE: 85 MMHG | SYSTOLIC BLOOD PRESSURE: 123 MMHG | BODY MASS INDEX: 28.92 KG/M2 | HEART RATE: 76 BPM

## 2023-03-30 DIAGNOSIS — S76.112A QUADRICEPS TENDON RUPTURE, LEFT, INITIAL ENCOUNTER: Primary | ICD-10-CM

## 2023-03-30 NOTE — PATIENT INSTRUCTIONS
- Had long discussion between operative vs non-operative treatment of the left quadriceps tendon tear  - Patient able to perform straight leg raise  At this time   - Risks and benefits were discussed with patient at length  Procedure being performed: Left quadriceps tendon repair and all other associated procedures   informed consent was obtained at this time  - Weight bearing as tolerated left lower extremity in knee immobilizer at all times   - Over the counter analgesics as needed / directed   - Ice / heat as directed   - Please d/c tobacco / nicotine use until otherwise stated  Failure to do so may result in wound healing complications, suboptimal surgical results, possible limb impairment, loss of limb     - Follow up 2 weeks    - Sutures out

## 2023-03-30 NOTE — H&P (VIEW-ONLY)
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, in light of this, patient consents to operative repair of rupture  - Risks and benefits were discussed with patient at length  Procedure being performed: Left quadriceps tendon repair and all other associated procedures   informed consent was obtained at this time  - Weight bearing as tolerated left lower extremity in knee immobilizer at all times   - Over the counter analgesics as needed / directed   - Ice / heat as directed   - Please d/c tobacco / nicotine use until otherwise stated  Failure to do so may result in wound healing complications, suboptimal surgical results, possible limb impairment, loss of limb  - Follow up 2 weeks    - Sutures out       To Do Next Visit:  Sutures out left knee   _____________________________________________________  CHIEF COMPLAINT:  Chief Complaint   Patient presents with   • Left Knee - Pain         SUBJECTIVE:  Narinder Tapia is a 63 y o  male who presents to the office for initial evaluation of his left knee  Patient states that he slipped and fell on 3/25/2023  Patient states he was carrying a box and states that the hit the wall causing him to fall  Patient is unsure of the exact mechanism of injury  Patient states that he was seen and evaluated at the ER soon after secondary to inability to weight-bear and severe pain left knee    X-rays were taken and patient was placed "in a knee immobilizer  Patient was seen and evaluated by Dr Kaylin Fernandez and a stat MRI was ordered of the left knee to evaluate for quadriceps tendon tear secondary to inability to straight leg raise  Patient states that he had his MRI performed and is here to discuss results  Patient states he does continue to have diffuse anterior knee pain becomes worse with direct contact and weightbearing  Patient has been weightbearing with the use of a knee immobilizer  Patient denies any new or worsening symptoms in the knee  Patient is noted to having a history of a distal femur fracture which was treated with traction when he was a child  Patient denies any numbness or tingling in the leg  Patient offers no other complaints at this time  PAST MEDICAL HISTORY:  Past Medical History:   Diagnosis Date   • Hyperlipidemia    • Shoulder abrasion     injured at work  11/6/21       PAST SURGICAL HISTORY:  Past Surgical History:   Procedure Laterality Date   • CARDIAC SURGERY      had a cardiac cath done 5/2021 at [facility]- showed \" slight \" blockage   • MOHS SURGERY      basal cell ca of nose   • MOUTH SURGERY     • NERVE BLOCK Right 10/14/2016    Procedure: LUMBAR TRANSFORAMINAL EPIDURAL L4 AND L5;  Surgeon: Jai Flores MD;  Location: [facility] MAIN OR;  Service:    • [place] SURGICAL ARTHROSCOPY SHOULDER W/ROTATOR CUFF RPR Right 11/18/2021    Procedure: REPAIR ROTATOR CUFF  ARTHROSCOPIC, BICEPS TENODESIS, SUBACROMIAL DECOMPRESSION;  Surgeon: Rowan Bosworth, MD;  Location: [facility] MAIN OR;  Service: Orthopedics       FAMILY HISTORY:  History reviewed  No pertinent family history      SOCIAL HISTORY:  Social History     Tobacco Use   • Smoking status: Former   • Smokeless tobacco: Current   • Tobacco comments:     Vape   Substance Use Topics   • Alcohol use: No   • Drug use: No       MEDICATIONS:    Current Outpatient Medications:   •  multivitamin-iron-minerals-folic acid (CENTRUM) chewable tablet, Chew 1 tablet daily, " "Disp: , Rfl:   •  naproxen (NAPROSYN) 500 mg tablet, Take 500 mg by mouth 2 (two) times a day, Disp: , Rfl:   •  RA Aspirin EC 81 MG EC tablet, Take 81 mg by mouth daily Last dose 11/9/21 , Disp: , Rfl:   •  rosuvastatin (CRESTOR) 20 MG tablet, Take 20 mg by mouth daily at bedtime, Disp: , Rfl:     ALLERGIES:  No Known Allergies    REVIEW OF SYSTEMS:  MSK: left knee pain   Neuro: WNL   Pertinent items are otherwise noted in HPI  A comprehensive review of systems was otherwise negative  LABS:  HgA1c: No results found for: HGBA1C  BMP:   Lab Results   Component Value Date    CALCIUM 8 8 03/29/2023    K 4 6 03/29/2023    CO2 27 03/29/2023     03/29/2023    BUN 18 03/29/2023    CREATININE 0 91 03/29/2023     CBC: No components found for: CBC    _____________________________________________________  PHYSICAL EXAMINATION:  Vital signs: /85   Pulse 76   Temp 97 8 °F (36 6 °C)   Ht 5' 10\" (1 778 m)   Wt 91 6 kg (202 lb)   BMI 28 98 kg/m²   General: No acute distress, awake and alert  Psychiatric: Mood and affect appear appropriate  HEENT: Trachea Midline, No torticollis, no apparent facial trauma  Cardiovascular: No audible murmurs; Extremities appear perfused  Pulmonary: No audible wheezing or stridor  Skin: No open lesions; see further details (if any) below      MUSCULOSKELETAL EXAMINATION:  Left knee examination:  - Patient sitting comfortably in the office in no acute distress   - Diffuse swelling noted throughout the left knee with moderate sized effusion  -Tenderness to palpation noted over the quadriceps tendon  No other bony or soft tissue tenderness palpation noted at this time    - Patient able to perform straight leg raise at this time with some weakness of quad muscle present and with pain  - NV intact    _____________________________________________________  STUDIES REVIEWED:  I personally reviewed the images and interpretation is as follows:  MRI knee left wo " "contrast  IMPRESSION:     High-grade near full-thickness tear of the distal quadriceps tendon adjacent to its patellar attachment  Superimposed quadriceps tendinosis      Mild cartilage thinning weightbearing portion of the medial femoral tibial compartment  Focal deep fissure lateral facet of the patellar articular cartilage      Grade 1 muscle strain of the vastus medialis and lateralis muscles      Anterior subcutaneous edema and fluid  PROCEDURES PERFORMED:  No procedures were performed at this time  Katie Coyle PA-C - assisting  Franc López                  Portions of the record may have been created with voice recognition software  Occasional wrong word or \"sound a like\" substitutions may have occurred due to the inherent limitations of voice recognition software  Read the chart carefully and recognize, using context, where substitutions have occurred        "

## 2023-03-30 NOTE — PRE-PROCEDURE INSTRUCTIONS
My Surgical Experience    The following information was developed to assist you to prepare for your operation  What do I need to do before coming to the hospital?  • Arrange for a responsible person to drive you to and from the hospital   • Arrange care for your children at home  Children are not allowed in the recovery areas of the hospital  • Plan to wear clothing that is easy to put on and take off  If you are having shoulder surgery, wear a shirt that buttons or zippers in the front  Bathing  o Shower the evening before and the morning of your surgery with an antibacterial soap  Please refer to the Pre Op Showering Instructions for Surgery Patients Sheet   o Remove nail polish and all body piercing jewelry  o Do not shave any body part for at least 24 hours before surgery-this includes face, arms, legs and upper body  Food  o Nothing to eat or drink after midnight the night before your surgery  This includes candy and chewing gum  o Exception: If your surgery is after 12:00pm (noon), you may have clear liquids such as 7-Up®, ginger ale, apple or cranberry juice, Jell-O®, water, or clear broth until 8:00 am  o Do not drink milk or juice with pulp on the morning before surgery  o Do not drink alcohol 24 hours before surgery  Medicine  o Follow instructions you received from your surgeon about which medicines you may take on the day of surgery  o If instructed to take medicine on the morning of surgery, take pills with just a small sip of water  Call your prescribing doctor for specific infroamtion on what to do if you take insulin    What should I bring to the hospital?    Bring:  • Crutches or a walker, if you have them, for foot or knee surgery  • A list of the daily medicines, vitamins, minerals, herbals and nutritional supplements you take   Include the dosages of medicines and the time you take them each day  • Glasses, dentures or hearing aids  • Minimal clothing; you will be wearing hospital sleepwear  • Photo ID; required to verify your identity  • If you have a Living Will or Power of , bring a copy of the documents  • If you have an ostomy, bring an extra pouch and any supplies you use    Do not bring  • Medicines or inhalers  • Money, valuables or jewelry    What other information should I know about the day of surgery? • Notify your surgeons if you develop a cold, sore throat, cough, fever, rash or any other illness  • Report to the Ambulatory Surgical/Same Day Surgery Unit  • You will be instructed to stop at Registration only if you have not been pre-registered  • Inform your  fi they do not stay that they will be asked by the staff to leave a phone number where they can be reached  • Be available to be reached before surgery  In the event the operating room schedule changes, you may be asked to come in earlier or later than expected    *It is important to tell your doctor and others involved in your health care if you are taking or have been taking any non-prescription drugs, vitamins, minerals, herbals or other nutritional supplements  Any of these may interact with some food or medicines and cause a reaction      Pre-Surgery Instructions:   Medication Instructions   • multivitamin-iron-minerals-folic acid (CENTRUM) chewable tablet Hold day of surgery  • naproxen (NAPROSYN) 500 mg tablet Hold day of surgery     • RA Aspirin EC 81 MG EC tablet Take night before surgery   • rosuvastatin (CRESTOR) 20 MG tablet Take night before surgery

## 2023-03-30 NOTE — PROGRESS NOTES
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, in light of this, patient consents to operative repair of rupture  - Risks and benefits were discussed with patient at length  Procedure being performed: Left quadriceps tendon repair and all other associated procedures   informed consent was obtained at this time  - Weight bearing as tolerated left lower extremity in knee immobilizer at all times   - Over the counter analgesics as needed / directed   - Ice / heat as directed   - Please d/c tobacco / nicotine use until otherwise stated  Failure to do so may result in wound healing complications, suboptimal surgical results, possible limb impairment, loss of limb  - Follow up 2 weeks    - Sutures out       To Do Next Visit:  Sutures out left knee   _____________________________________________________  CHIEF COMPLAINT:  Chief Complaint   Patient presents with   • Left Knee - Pain         SUBJECTIVE:  Lisseth Olivas is a 63 y o  male who presents to the office for initial evaluation of his left knee  Patient states that he slipped and fell on 3/25/2023  Patient states he was carrying a box and states that the hit the wall causing him to fall  Patient is unsure of the exact mechanism of injury  Patient states that he was seen and evaluated at the ER soon after secondary to inability to weight-bear and severe pain left knee    X-rays were taken and patient was placed "in a knee immobilizer  Patient was seen and evaluated by Dr Iman Kwan and a stat MRI was ordered of the left knee to evaluate for quadriceps tendon tear secondary to inability to straight leg raise  Patient states that he had his MRI performed and is here to discuss results  Patient states he does continue to have diffuse anterior knee pain becomes worse with direct contact and weightbearing  Patient has been weightbearing with the use of a knee immobilizer  Patient denies any new or worsening symptoms in the knee  Patient is noted to having a history of a distal femur fracture which was treated with traction when he was a child  Patient denies any numbness or tingling in the leg  Patient offers no other complaints at this time  PAST MEDICAL HISTORY:  Past Medical History:   Diagnosis Date   • Hyperlipidemia    • Shoulder abrasion     injured at work  11/6/21       PAST SURGICAL HISTORY:  Past Surgical History:   Procedure Laterality Date   • CARDIAC SURGERY      had a cardiac cath done [facility] at [facility]- showed \" slight \" blockage   • MOHS SURGERY      basal cell ca of nose   • MOUTH SURGERY     • NERVE BLOCK Right 10/14/2016    Procedure: LUMBAR TRANSFORAMINAL EPIDURAL L4 AND L5;  Surgeon: Sandra Garrett MD;  Location: [place] [address] MAIN OR;  Service:    • [place] SURGICAL ARTHROSCOPY SHOULDER W/ROTATOR CUFF RPR Right 11/18/2021    Procedure: REPAIR ROTATOR CUFF  ARTHROSCOPIC, BICEPS TENODESIS, SUBACROMIAL DECOMPRESSION;  Surgeon: Roberto Harry MD;  Location: [facility] MAIN OR;  Service: Orthopedics       FAMILY HISTORY:  History reviewed  No pertinent family history      SOCIAL HISTORY:  Social History     Tobacco Use   • Smoking status: Former   • Smokeless tobacco: Current   • Tobacco comments:     Vape   Substance Use Topics   • Alcohol use: No   • Drug use: No       MEDICATIONS:    Current Outpatient Medications:   •  multivitamin-iron-minerals-folic acid (CENTRUM) chewable tablet, Chew 1 tablet daily, " "Disp: , Rfl:   •  naproxen (NAPROSYN) 500 mg tablet, Take 500 mg by mouth 2 (two) times a day, Disp: , Rfl:   •  RA Aspirin EC 81 MG EC tablet, Take 81 mg by mouth daily Last dose 11/9/21 , Disp: , Rfl:   •  rosuvastatin (CRESTOR) 20 MG tablet, Take 20 mg by mouth daily at bedtime, Disp: , Rfl:     ALLERGIES:  No Known Allergies    REVIEW OF SYSTEMS:  MSK: left knee pain   Neuro: WNL   Pertinent items are otherwise noted in HPI  A comprehensive review of systems was otherwise negative  LABS:  HgA1c: No results found for: HGBA1C  BMP:   Lab Results   Component Value Date    CALCIUM 8 8 03/29/2023    K 4 6 03/29/2023    CO2 27 03/29/2023     03/29/2023    BUN 18 03/29/2023    CREATININE 0 91 03/29/2023     CBC: No components found for: CBC    _____________________________________________________  PHYSICAL EXAMINATION:  Vital signs: /85   Pulse 76   Temp 97 8 °F (36 6 °C)   Ht 5' 10\" (1 778 m)   Wt 91 6 kg (202 lb)   BMI 28 98 kg/m²   General: No acute distress, awake and alert  Psychiatric: Mood and affect appear appropriate  HEENT: Trachea Midline, No torticollis, no apparent facial trauma  Cardiovascular: No audible murmurs; Extremities appear perfused  Pulmonary: No audible wheezing or stridor  Skin: No open lesions; see further details (if any) below      MUSCULOSKELETAL EXAMINATION:  Left knee examination:  - Patient sitting comfortably in the office in no acute distress   - Diffuse swelling noted throughout the left knee with moderate sized effusion  -Tenderness to palpation noted over the quadriceps tendon  No other bony or soft tissue tenderness palpation noted at this time    - Patient able to perform straight leg raise at this time with some weakness of quad muscle present and with pain  - NV intact    _____________________________________________________  STUDIES REVIEWED:  I personally reviewed the images and interpretation is as follows:  MRI knee left wo " "contrast  IMPRESSION:     High-grade near full-thickness tear of the distal quadriceps tendon adjacent to its patellar attachment  Superimposed quadriceps tendinosis      Mild cartilage thinning weightbearing portion of the medial femoral tibial compartment  Focal deep fissure lateral facet of the patellar articular cartilage      Grade 1 muscle strain of the vastus medialis and lateralis muscles      Anterior subcutaneous edema and fluid  PROCEDURES PERFORMED:  No procedures were performed at this time  Lisa Parker PA-C - assisting  Ric López                  Portions of the record may have been created with voice recognition software  Occasional wrong word or \"sound a like\" substitutions may have occurred due to the inherent limitations of voice recognition software  Read the chart carefully and recognize, using context, where substitutions have occurred        "

## 2023-03-31 ENCOUNTER — ANESTHESIA (OUTPATIENT)
Dept: PERIOP | Facility: HOSPITAL | Age: 63
End: 2023-03-31

## 2023-03-31 ENCOUNTER — HOSPITAL ENCOUNTER (OUTPATIENT)
Facility: HOSPITAL | Age: 63
Setting detail: OUTPATIENT SURGERY
Discharge: HOME | End: 2023-03-31
Attending: ORTHOPAEDIC SURGERY | Admitting: ORTHOPAEDIC SURGERY

## 2023-03-31 VITALS
RESPIRATION RATE: 18 BRPM | DIASTOLIC BLOOD PRESSURE: 70 MMHG | WEIGHT: 203.8 LBS | SYSTOLIC BLOOD PRESSURE: 127 MMHG | HEIGHT: 70 IN | TEMPERATURE: 98.3 F | OXYGEN SATURATION: 93 % | HEART RATE: 67 BPM | BODY MASS INDEX: 29.18 KG/M2

## 2023-03-31 DIAGNOSIS — S76.112A QUADRICEPS TENDON RUPTURE, LEFT, INITIAL ENCOUNTER: Primary | ICD-10-CM

## 2023-03-31 LAB
ATRIAL RATE: 66 BPM
P AXIS: 69 DEGREES
PR INTERVAL: 138 MS
QRS AXIS: 21 DEGREES
QRSD INTERVAL: 82 MS
QT INTERVAL: 386 MS
QTC INTERVAL: 404 MS
T WAVE AXIS: 29 DEGREES
VENTRICULAR RATE: 66 BPM

## 2023-03-31 RX ORDER — ROCURONIUM BROMIDE 10 MG/ML
INJECTION, SOLUTION INTRAVENOUS AS NEEDED
Status: DISCONTINUED | OUTPATIENT
Start: 2023-03-31 | End: 2023-03-31

## 2023-03-31 RX ORDER — HYDROMORPHONE HCL/PF 1 MG/ML
0.4 SYRINGE (ML) INJECTION
Status: DISCONTINUED | OUTPATIENT
Start: 2023-03-31 | End: 2023-03-31 | Stop reason: HOSPADM

## 2023-03-31 RX ORDER — FENTANYL CITRATE 50 UG/ML
INJECTION, SOLUTION INTRAMUSCULAR; INTRAVENOUS AS NEEDED
Status: DISCONTINUED | OUTPATIENT
Start: 2023-03-31 | End: 2023-03-31

## 2023-03-31 RX ORDER — LIDOCAINE HYDROCHLORIDE 10 MG/ML
INJECTION, SOLUTION EPIDURAL; INFILTRATION; INTRACAUDAL; PERINEURAL AS NEEDED
Status: DISCONTINUED | OUTPATIENT
Start: 2023-03-31 | End: 2023-03-31

## 2023-03-31 RX ORDER — HYDROMORPHONE HCL/PF 1 MG/ML
SYRINGE (ML) INJECTION AS NEEDED
Status: DISCONTINUED | OUTPATIENT
Start: 2023-03-31 | End: 2023-03-31

## 2023-03-31 RX ORDER — MIDAZOLAM HYDROCHLORIDE 2 MG/2ML
INJECTION, SOLUTION INTRAMUSCULAR; INTRAVENOUS AS NEEDED
Status: DISCONTINUED | OUTPATIENT
Start: 2023-03-31 | End: 2023-03-31

## 2023-03-31 RX ORDER — CEFAZOLIN SODIUM 2 G/50ML
SOLUTION INTRAVENOUS AS NEEDED
Status: DISCONTINUED | OUTPATIENT
Start: 2023-03-31 | End: 2023-03-31

## 2023-03-31 RX ORDER — ACETAMINOPHEN 325 MG/1
650 TABLET ORAL EVERY 6 HOURS PRN
Status: CANCELLED | OUTPATIENT
Start: 2023-03-31

## 2023-03-31 RX ORDER — ONDANSETRON 2 MG/ML
4 INJECTION INTRAMUSCULAR; INTRAVENOUS EVERY 6 HOURS PRN
Status: CANCELLED | OUTPATIENT
Start: 2023-03-31

## 2023-03-31 RX ORDER — SODIUM CHLORIDE, SODIUM LACTATE, POTASSIUM CHLORIDE, CALCIUM CHLORIDE 600; 310; 30; 20 MG/100ML; MG/100ML; MG/100ML; MG/100ML
125 INJECTION, SOLUTION INTRAVENOUS CONTINUOUS
Status: DISCONTINUED | OUTPATIENT
Start: 2023-03-31 | End: 2023-03-31 | Stop reason: HOSPADM

## 2023-03-31 RX ORDER — ACETAMINOPHEN/DIPHENHYDRAMINE 500MG-25MG
81 TABLET ORAL EVERY 12 HOURS
Qty: 56 TABLET | Refills: 0 | Status: SHIPPED | OUTPATIENT
Start: 2023-03-31 | End: 2023-04-28

## 2023-03-31 RX ORDER — CEFAZOLIN SODIUM 2 G/50ML
2000 SOLUTION INTRAVENOUS ONCE
Status: DISCONTINUED | OUTPATIENT
Start: 2023-03-31 | End: 2023-03-31 | Stop reason: HOSPADM

## 2023-03-31 RX ORDER — CHLORHEXIDINE GLUCONATE 0.12 MG/ML
15 RINSE ORAL ONCE
Status: COMPLETED | OUTPATIENT
Start: 2023-03-31 | End: 2023-03-31

## 2023-03-31 RX ORDER — KETOROLAC TROMETHAMINE 30 MG/ML
INJECTION, SOLUTION INTRAMUSCULAR; INTRAVENOUS AS NEEDED
Status: DISCONTINUED | OUTPATIENT
Start: 2023-03-31 | End: 2023-03-31

## 2023-03-31 RX ORDER — ONDANSETRON 2 MG/ML
4 INJECTION INTRAMUSCULAR; INTRAVENOUS ONCE AS NEEDED
Status: DISCONTINUED | OUTPATIENT
Start: 2023-03-31 | End: 2023-03-31 | Stop reason: HOSPADM

## 2023-03-31 RX ORDER — MAGNESIUM HYDROXIDE 1200 MG/15ML
LIQUID ORAL AS NEEDED
Status: DISCONTINUED | OUTPATIENT
Start: 2023-03-31 | End: 2023-03-31 | Stop reason: HOSPADM

## 2023-03-31 RX ORDER — TRAMADOL HYDROCHLORIDE 50 MG/1
50 TABLET ORAL EVERY 6 HOURS SCHEDULED
Status: CANCELLED | OUTPATIENT
Start: 2023-03-31

## 2023-03-31 RX ORDER — FENTANYL CITRATE/PF 50 MCG/ML
50 SYRINGE (ML) INJECTION
Status: DISCONTINUED | OUTPATIENT
Start: 2023-03-31 | End: 2023-03-31 | Stop reason: HOSPADM

## 2023-03-31 RX ORDER — PROPOFOL 10 MG/ML
INJECTION, EMULSION INTRAVENOUS AS NEEDED
Status: DISCONTINUED | OUTPATIENT
Start: 2023-03-31 | End: 2023-03-31

## 2023-03-31 RX ORDER — OXYCODONE HYDROCHLORIDE 5 MG/1
5 TABLET ORAL EVERY 4 HOURS PRN
Status: DISCONTINUED | OUTPATIENT
Start: 2023-03-31 | End: 2023-03-31 | Stop reason: HOSPADM

## 2023-03-31 RX ORDER — OXYCODONE HYDROCHLORIDE 5 MG/1
5 TABLET ORAL EVERY 4 HOURS PRN
Qty: 30 TABLET | Refills: 0 | Status: SHIPPED | OUTPATIENT
Start: 2023-03-31 | End: 2023-04-04 | Stop reason: SDUPTHER

## 2023-03-31 RX ORDER — BUPIVACAINE HYDROCHLORIDE 2.5 MG/ML
INJECTION, SOLUTION EPIDURAL; INFILTRATION; INTRACAUDAL AS NEEDED
Status: DISCONTINUED | OUTPATIENT
Start: 2023-03-31 | End: 2023-03-31 | Stop reason: HOSPADM

## 2023-03-31 RX ADMIN — FENTANYL CITRATE 50 MCG: 50 INJECTION, SOLUTION INTRAMUSCULAR; INTRAVENOUS at 10:32

## 2023-03-31 RX ADMIN — FENTANYL CITRATE 50 MCG: 50 INJECTION, SOLUTION INTRAMUSCULAR; INTRAVENOUS at 10:01

## 2023-03-31 RX ADMIN — SODIUM CHLORIDE, SODIUM LACTATE, POTASSIUM CHLORIDE, AND CALCIUM CHLORIDE: .6; .31; .03; .02 INJECTION, SOLUTION INTRAVENOUS at 08:51

## 2023-03-31 RX ADMIN — CHLORHEXIDINE GLUCONATE 15 ML: 1.2 RINSE ORAL at 07:28

## 2023-03-31 RX ADMIN — MIDAZOLAM 2 MG: 1 INJECTION INTRAMUSCULAR; INTRAVENOUS at 07:53

## 2023-03-31 RX ADMIN — KETOROLAC TROMETHAMINE 30 MG: 30 INJECTION, SOLUTION INTRAMUSCULAR at 09:20

## 2023-03-31 RX ADMIN — HYDROMORPHONE HYDROCHLORIDE 0.5 MG: 1 INJECTION, SOLUTION INTRAMUSCULAR; INTRAVENOUS; SUBCUTANEOUS at 09:42

## 2023-03-31 RX ADMIN — PROPOFOL 200 MG: 10 INJECTION, EMULSION INTRAVENOUS at 08:02

## 2023-03-31 RX ADMIN — LIDOCAINE HYDROCHLORIDE 50 MG: 10 INJECTION, SOLUTION EPIDURAL; INFILTRATION; INTRACAUDAL; PERINEURAL at 08:02

## 2023-03-31 RX ADMIN — CEFAZOLIN SODIUM 2000 MG: 2 SOLUTION INTRAVENOUS at 07:58

## 2023-03-31 RX ADMIN — FENTANYL CITRATE 50 MCG: 50 INJECTION, SOLUTION INTRAMUSCULAR; INTRAVENOUS at 08:24

## 2023-03-31 RX ADMIN — SODIUM CHLORIDE, SODIUM LACTATE, POTASSIUM CHLORIDE, AND CALCIUM CHLORIDE 125 ML/HR: .6; .31; .03; .02 INJECTION, SOLUTION INTRAVENOUS at 07:30

## 2023-03-31 RX ADMIN — SUGAMMADEX 200 MG: 100 INJECTION, SOLUTION INTRAVENOUS at 09:29

## 2023-03-31 RX ADMIN — ROCURONIUM BROMIDE 50 MG: 10 INJECTION, SOLUTION INTRAVENOUS at 08:02

## 2023-03-31 RX ADMIN — HYDROMORPHONE HYDROCHLORIDE 0.5 MG: 1 INJECTION, SOLUTION INTRAMUSCULAR; INTRAVENOUS; SUBCUTANEOUS at 09:34

## 2023-03-31 RX ADMIN — FENTANYL CITRATE 50 MCG: 50 INJECTION, SOLUTION INTRAMUSCULAR; INTRAVENOUS at 08:02

## 2023-03-31 RX ADMIN — OXYCODONE HYDROCHLORIDE 5 MG: 5 TABLET ORAL at 10:36

## 2023-03-31 RX ADMIN — ROCURONIUM BROMIDE 30 MG: 10 INJECTION, SOLUTION INTRAVENOUS at 08:28

## 2023-03-31 RX ADMIN — HYDROMORPHONE HYDROCHLORIDE 1 MG: 1 INJECTION, SOLUTION INTRAMUSCULAR; INTRAVENOUS; SUBCUTANEOUS at 08:25

## 2023-03-31 NOTE — DISCHARGE INSTR - AVS FIRST PAGE
Discharge Instructions - Orthopedics  Alli Tenorio 63 y o  male MRN: [id number]  Unit/Bed#: [facility] OR MAIN    Weight Bearing Status:                                           weight bearing as tolerated   left   lower  extremity with knee immobilizer on at all times       DVT prophylaxis  Aspirin 81mg twice daily  x  28 days post operatively     Pain:  Continue analgesics as directed    Dressing Instructions: May remove dressings in 5 days  Okay To begin showering at that time  Allow water to flow over the incision sites  Do not vigorously scrub or soak wounds until otherwise stated  Appt Instructions: If you do not have your appointment, please call the clinic at [phone]   Otherwise followup as scheduled     Contact the office sooner if you experience any increased numbness/tingling in the extremities

## 2023-03-31 NOTE — OP NOTE
OPERATIVE REPORT  PATIENT NAME: Jhon November    :  1960  MRN: [id number]  Pt Location: [facility] OR ROOM 03    SURGERY DATE: 3/31/2023    Surgeon(s) and Role:     * Racheal Gautam MD - Primary     * Lana Guerrero PA-C - Assisting     * Jama Ross MD - Assisting    Preop Diagnosis:  Quadriceps tendon rupture, left, initial encounter [S76 112A]    Post-Op Diagnosis Codes:     * Quadriceps tendon rupture, left, initial encounter [S76 112A]    Procedure(s):  L quadriceps tendon repair, primary (CPT 91567)    Specimen(s):  * No specimens in log *    Estimated Blood Loss:   20 cc    Drains:  * No LDAs found *    Anesthesia Type:   General    Operative Indications:  Near complete L quadriceps tendon rupture    Operative Findings:  See below    Complications:   None    Procedure and Technique:  The patient is a 22-year-old male who sustained a near complete left quadriceps tendon rupture  Risk and benefit discussion as documented in yesterday's office clinic note  The patient agreed to risks of surgery and agreed to proceed with quadriceps tendon repair  The patient operative site, laterality, procedure, consent were verified in the preoperative area and the patient was taken to the operating room  General anesthesia was induced and the operative extremity was prepped and draped in the usual sterile fashion  After timeout, a midline incision was made in standard fashion and medial and lateral full-thickness skin flaps were elevated  The superficial knee tissues were dissected and the quadriceps tendon tear was easily identified  There was noted to be retraction from the superior pole of the patella  There was a small portion of medial and lateral tendon that still remained attached to the superior pole but a large central defect where the tendon had pulled off of the patellar surface  The nonviable appearing tissue at the end of the quadriceps tendon was debrided with rongeur    A tressa was utilized to create a bleeding bone bed for healing  #2 Ethibond suture was then utilized in a Krackow pattern to create 4 suture limbs  3 transosseous tunnels were then drilled and suture passer was utilized to transport the suture limbs to the inferior aspect of the patella  The knee was then brought into full extension and the tendon was placed into proper tension and the sutures were tied at the inferior aspect of the patella  Approximation of the tendon to the bone was satisfactory after repair was complete  The medial aspect of the tendon split was then repaired side-to-side with the remaining quadriceps tendon at the superior aspect of the patella with Ethibond suture  The wound was copiously irrigated with sterile saline  Deep tissues were repaired with Ethibond and heavy Vicryl suture, subcutaneous layer closure took place with Vicryl suture, skin layer closure took place with nylon suture  Sterile dressing was applied  The leg was wrapped from foot to thigh  The patient was placed in a knee immobilizer with the knee in full extension  All final counts were correct  I was present for the entire procedure  The patient was extubated and awakened and taken to the PACU in stable condition  There were no immediate complications  The patient will be weightbearing as tolerated on the operative extremity and knee immobilizer  He may remove dressings in 5 days while keeping knee in full extension and maintaining brace  We will consider suture removal in 2 weeks  He will use aspirin for DVT prophylaxis twice daily      Patient Disposition:  PACU         SIGNATURE: Arleth Abraham MD  DATE: March 31, 2023  TIME: 9:23 AM

## 2023-03-31 NOTE — ANESTHESIA PREPROCEDURE EVALUATION
Procedure:  REPAIR TENDON LEFT QUADRICEPS (Left: Thigh)    Relevant Problems   CARDIO   (+) Hyperlipidemia        Physical Exam    Airway    Mallampati score: II  TM Distance: >3 FB  Neck ROM: full     Dental   No notable dental hx     Cardiovascular  Cardiovascular exam normal    Pulmonary  Pulmonary exam normal     Other Findings        Anesthesia Plan  ASA Score- 2     Anesthesia Type- general with ASA Monitors  Additional Monitors:   Airway Plan: LMA  Plan Factors-Exercise tolerance (METS): >4 METS  Chart reviewed  Imaging results reviewed  Existing labs reviewed  Patient summary reviewed  Patient is not a current smoker  Induction- intravenous  Postoperative Plan- Plan for postoperative opioid use  Informed Consent- Anesthetic plan and risks discussed with patient  I personally reviewed this patient with the CRNA  Discussed and agreed on the Anesthesia Plan with the CRNA  Ajith Kwok

## 2023-03-31 NOTE — INTERVAL H&P NOTE
H&P reviewed  After examining the patient I find no changes in the patients condition since the H&P had been written      Abdomen : soft, present     Vitals:    03/31/23 0714   BP: 136/69   Pulse: 72   Resp: 18   Temp: 98 3 °F (36 8 °C)   SpO2: 96%

## 2023-03-31 NOTE — ANESTHESIA POSTPROCEDURE EVALUATION
Post-Op Assessment Note    CV Status:  Stable  Pain Score: 0    Pain management: adequate  Multimodal analgesia used between 6 hours prior to anesthesia start to PACU discharge    Mental Status:  Arousable and sleepy   Hydration Status:  Stable   PONV Controlled:  None   Airway Patency:  Patent   Two or more mitigation strategies used for obstructive sleep apnea   Post Op Vitals Reviewed: Yes      Staff: CRNA         No notable events documented      BP   120/64   Temp 97   Pulse 75   Resp 16   SpO2 99

## 2023-03-31 NOTE — PERIOPERATIVE NURSING NOTE
Pt d/c to home at this time w/ son via wheelchair to main lobby to meet son with truck  Pt left with all belongings  Iv was D/C intact with dry sterile dressing  Encouraged to keep follow up appointments, Verbalized understanding  Knee immobilizer in place  +2 pedal pulse  Pt states minimal pain  D/C instructions reviewed and explained  Verbalized understanding  New Rx at pt pharmacy waiting and explained he can take another oxycodone if needed anytime after 2:30  If he needs pain meds before that time he can take tylenol  Verbalized understanding

## 2023-04-04 ENCOUNTER — TELEPHONE (OUTPATIENT)
Dept: OBGYN CLINIC | Facility: HOSPITAL | Age: 63
End: 2023-04-04

## 2023-04-04 DIAGNOSIS — S76.112A QUADRICEPS TENDON RUPTURE, LEFT, INITIAL ENCOUNTER: ICD-10-CM

## 2023-04-04 RX ORDER — OXYCODONE HYDROCHLORIDE 5 MG/1
5 TABLET ORAL EVERY 4 HOURS PRN
Qty: 30 TABLET | Refills: 0 | Status: SHIPPED | OUTPATIENT
Start: 2023-04-04 | End: 2023-04-11 | Stop reason: SDUPTHER

## 2023-04-04 NOTE — TELEPHONE ENCOUNTER
Called and notified pt that med sent  States he is trying to send Dr Marisel Li via \[facility]"" SERVICES an uploaded Release of Information for Disability form  Will send to [facility] Clinical/Clerical to make them aware as pt was having problems finding Dr Quan Zhu name

## 2023-04-04 NOTE — TELEPHONE ENCOUNTER
Pt contacted Call Center requested refill of their medication  Medication Name: Oxycodone       Dosage of Med: 5mg       Frequency of Med:   Take 1 tablet (5 mg total) by mouth every 4 (four) hours as needed for moderate pain Max Daily Amount: 30 mg         Remaining Medication: N/A       Pharmacy and Location: [address] [place] [address] [id number] [place] [id number]   Phone:  [phone]  Fax:  [phone]         Pt  Preferred Callback Phone Number: [phone]      Thank you

## 2023-04-05 NOTE — TELEPHONE ENCOUNTER
"Caller: Patient    Doctor: Fanta Higginbotham    Reason for call: Pharmacy called yesterday stating it was ready but upon  stated \"it was too soon\" and would not be ready until tomorrow  Is provider able to call pharmacy to see what the problem is?     Call back#: [phone]  "

## 2023-04-06 ENCOUNTER — TELEPHONE (OUTPATIENT)
Dept: OBGYN CLINIC | Facility: CLINIC | Age: 63
End: 2023-04-06

## 2023-04-24 ENCOUNTER — OFFICE VISIT (OUTPATIENT)
Dept: PHYSICAL THERAPY | Facility: CLINIC | Age: 63
End: 2023-04-24

## 2023-04-24 DIAGNOSIS — S76.112A QUADRICEPS TENDON RUPTURE, LEFT, INITIAL ENCOUNTER: Primary | ICD-10-CM

## 2023-04-24 NOTE — PROGRESS NOTES
Daily Note     Today's date: 2023  Patient name: Elpidio Johnson  : 1960  MRN: [id number]  Referring provider: Erik To MD  Dx:   Encounter Diagnosis     ICD-10-CM    1  Quadriceps tendon rupture, left, initial encounter  S61 [id number]                      Subjective: Patient reports pain was well controlled over the weekend, noting that he has been icing etc  Patient notes he has been compliant with HEP  Objective: See treatment diary below      Assessment: Tolerated treatment well  Patient exhibits good ability to perform quad set and SLR without extensor lag  Patient exhibited good technique with therapeutic exercises and would benefit from continued PT      Plan: Continue per plan of care  Precautions:   Past Medical History:   Diagnosis Date   • Hyperlipidemia    • Shoulder abrasion     injured at work  23     See attached protocol: Quadriceps tendon repair    DOS: 2023    Date: 2023     Visit # 1 Eval 2 3     Manuals        PROM as per protocol  TC TC     MRE  ABD/ADD with brace donned 10x each ABD/ADD with brace donned 20x each                     Neuro Re-Ed        SLR with brace locked (flex/abd)  10x each 10x each     Prone leg extension  ---      Standing hip abduction/ext w/ TB  No TB 20x with left No TB 20x with left and 10x with right     Bridges on PB  20x 20x                             Ther Ex        Weight shifting  30x 30x     Heel slides with PT to ROM per protocol  With PB 20x With PB 20x     Heel raises  20x at bar 20x at bar                                                     Ther Activity                        Gait Training                        Modalities                          Access Code: [email or web address]  URL: [email or web address]  Date: 2023  Prepared by: Naveen Eddy    Exercises  - Supine Quadricep Sets  - 1 x daily - 7 x weekly - 2 sets - 10 reps - 5 secs hold  - Gluteal Sets  - 1 x daily - 7 x weekly - 2 sets - 10 reps - 5 secs hold  - Supine Single Leg Ankle Pumps  - 1 x daily - 7 x weekly - 2 sets - 10 reps  - Active Straight Leg Raise with Quad Set  - 1 x daily - 7 x weekly - 2 sets - 10 reps  - Standing Hip Abduction with Counter Support  - 1 x daily - 7 x weekly - 2 sets - 10 reps

## 2023-04-26 ENCOUNTER — TELEPHONE (OUTPATIENT)
Dept: PHYSICAL THERAPY | Facility: CLINIC | Age: 63
End: 2023-04-26

## 2023-04-26 NOTE — TELEPHONE ENCOUNTER
Left VM for patient regarding 8:45 appt  Advised patient of later available appointment and advised to return call to reschedule if possible

## 2023-05-01 ENCOUNTER — OFFICE VISIT (OUTPATIENT)
Dept: PHYSICAL THERAPY | Facility: CLINIC | Age: 63
End: 2023-05-01

## 2023-05-01 DIAGNOSIS — S76.112A QUADRICEPS TENDON RUPTURE, LEFT, INITIAL ENCOUNTER: Primary | ICD-10-CM

## 2023-05-01 NOTE — PROGRESS NOTES
"Daily Note     Today's date: 2023  Patient name: Robert Mix  : 1960  MRN: [id number]  Referring provider: Marly Garcia MD  Dx:   Encounter Diagnosis     ICD-10-CM    1  Quadriceps tendon rupture, left, initial encounter  R93 616H                      Subjective: Patient reports that he is sore around his knee cap area  Patient notes that he was very busy around the home noting he finished moving  Patient states that he is generally ache and fatigued  Objective: See treatment diary below      Assessment: Tolerated treatment fair  Patient exhibited early fatigue today, held off on SLR and focused on quad sets for quad activation, advised to continue with HEP and ice  Patient exhibited good technique with therapeutic exercises and would benefit from continued PT      Plan: Continue per plan of care        Precautions:   Past Medical History:   Diagnosis Date    Hyperlipidemia     Shoulder abrasion     injured at work  23     See attached protocol: Quadriceps tendon repair    DOS: 2023    Date: 2023   Visit # 1 Eval 2 3 4    Manuals        PROM as per protocol  TC TC  TC   MRE  ABD/ADD with brace donned 10x each ABD/ADD with brace donned 20x each  ABD/ADD with brace donned 20x each   Cues for quad set     10x           Neuro Re-Ed        SLR with brace locked (flex/abd)  10x each 10x each  ---   Prone leg extension  ---      Standing hip abduction/ext w/ TB  No TB 20x with left No TB 20x with left and 10x with right  No TB 20x with left and 10x with right   Bridges on PB  20x 20x  20x                           Ther Ex        Weight shifting  30x 30x  20   Heel slides with PT to ROM per protocol  With PB 20x With PB 20x  With PB 20x   Heel raises  20x at bar 20x at bar  20x at bar with quad set   Quad set     5\" holds 20x                                           Ther Activity                        Gait Training                    " Modalities                          Access Code: [email or web address]  URL: [email or web address]"/  Date: 04/18/2023  Prepared by: George Flash    Exercises  - Supine Quadricep Sets  - 1 x daily - 7 x weekly - 2 sets - 10 reps - 5 secs hold  - Gluteal Sets  - 1 x daily - 7 x weekly - 2 sets - 10 reps - 5 secs hold  - Supine Single Leg Ankle Pumps  - 1 x daily - 7 x weekly - 2 sets - 10 reps  - Active Straight Leg Raise with Quad Set  - 1 x daily - 7 x weekly - 2 sets - 10 reps  - Standing Hip Abduction with Counter Support  - 1 x daily - 7 x weekly - 2 sets - 10 reps

## 2023-05-04 ENCOUNTER — OFFICE VISIT (OUTPATIENT)
Dept: PHYSICAL THERAPY | Facility: CLINIC | Age: 63
End: 2023-05-04

## 2023-05-04 DIAGNOSIS — S76.112A QUADRICEPS TENDON RUPTURE, LEFT, INITIAL ENCOUNTER: Primary | ICD-10-CM

## 2023-05-04 NOTE — PROGRESS NOTES
"Daily Note     Today's date: 2023   Patient name: Igor Lawler  : 1960  MRN: [id number]  Referring provider: Yanelis Jauregui MD  Dx:   Encounter Diagnosis     ICD-10-CM    1  Quadriceps tendon rupture, left, initial encounter  F05 955M                      Subjective: Patient reports that he did focus on quad sets and feels he is feeling that quad becoming stronger  Patient notes he is frustrated with the precautions but understands the necessity  Objective: See treatment diary below      Assessment: Tolerated treatment fair  Patient exhibits bettered quality quad sets today, continued to advance as per protocol  Patient exhibited good technique with therapeutic exercises and would benefit from continued PT      Plan: Continue per plan of care        Precautions:   Past Medical History:   Diagnosis Date    Hyperlipidemia     Shoulder abrasion     injured at work  23     See attached protocol: Quadriceps tendon repair    DOS: 2023    Date: 2023   Visit # 6 2 3 4 5   Manuals        PROM as per protocol TC TC TC  TC   MRE ABD/ADD with brace donned 20x each ABD/ADD with brace donned 10x each ABD/ADD with brace donned 20x each  ABD/ADD with brace donned 20x each   Cues for quad set 20x    10x           Neuro Re-Ed        SLR with brace locked (flex/abd) 10x each 10x each 10x each  ---   Prone leg extension 10x ---      Standing hip abduction/ext w/ TB With YTB 2x10 R/L brace locked No TB 20x with left No TB 20x with left and 10x with right  No TB 20x with left and 10x with right   Bridges on PB 20x 20x 20x  20x                           Ther Ex        Weight shifting  30x 30x  20   Heel slides with PT to ROM per protocol With PB 20x with cues for hamstrings With PB 20x With PB 20x  With PB 20x   Heel raises 20x at bar with quad set 20x at bar 20x at bar  20x at bar with quad set   Quad set 5\" holds 20x    5\" holds 20x                 " Ther Activity                        Gait Training                        Modalities                          Access Code: [email or web address]  URL: [email or web address]  Date: 04/18/2023  Prepared by: Henna Gutierrez    Exercises  - Supine Quadricep Sets  - 1 x daily - 7 x weekly - 2 sets - 10 reps - 5 secs hold  - Gluteal Sets  - 1 x daily - 7 x weekly - 2 sets - 10 reps - 5 secs hold  - Supine Single Leg Ankle Pumps  - 1 x daily - 7 x weekly - 2 sets - 10 reps  - Active Straight Leg Raise with Quad Set  - 1 x daily - 7 x weekly - 2 sets - 10 reps  - Standing Hip Abduction with Counter Support  - 1 x daily - 7 x weekly - 2 sets - 10 reps

## 2023-05-08 ENCOUNTER — OFFICE VISIT (OUTPATIENT)
Dept: PHYSICAL THERAPY | Facility: CLINIC | Age: 63
End: 2023-05-08

## 2023-05-08 DIAGNOSIS — S76.112A QUADRICEPS TENDON RUPTURE, LEFT, INITIAL ENCOUNTER: Primary | ICD-10-CM

## 2023-05-08 NOTE — PROGRESS NOTES
"Daily Note     Today's date: 2023   Patient name: Messi Whiting   : 1960  MRN: [id number]  Referring provider: Viktoriya Garcia MD  Dx:   Encounter Diagnosis     ICD-10-CM    1  Quadriceps tendon rupture, left, initial encounter  A58 [id number]                      Subjective: Patient reports that he spent a lot of time on his feet over the weekend and notes some soreness but no pain  Patient states he is pleased with the continued improvements in levels of pain  Objective: See treatment diary below      Assessment: Tolerated treatment fair  Advanced patient as per protocol, tolerated well  Patient exhibited good technique with therapeutic exercises and would benefit from continued PT      Plan: Continue per plan of care        Precautions:   Past Medical History:   Diagnosis Date   • Hyperlipidemia    • Shoulder abrasion     injured at work  23     See attached protocol: Quadriceps tendon repair    DOS: 2023    Date: 2023   Visit # 6 7 3 4 5   Manuals        PROM as per protocol TC TC TC  TC   MRE ABD/ADD with brace donned 20x each ABD/ADD with brace donned 20x each ABD/ADD with brace donned 20x each  ABD/ADD with brace donned 20x each   Cues for quad set 20x    10x           Neuro Re-Ed        SLR with brace locked (flex/abd) 10x each 10x each 10x each  ---   Prone leg extension 10x ---      Standing hip abduction/ext w/ TB With YTB 2x10 R/L brace locked With YTB 2x10 R/L brace locked No TB 20x with left and 10x with right  No TB 20x with left and 10x with right   Bridges on PB 20x 20x 20x  20x                           Ther Ex        Weight shifting  30x 30x  20   Heel slides with PT to ROM per protocol With PB 20x with cues for hamstrings With PB 20x With PB 20x  With PB 20x   Heel raises 20x at bar with quad set 20x at bar 20x at bar  20x at bar with quad set   Quad set 5\" holds 20x 5\" holds 20x   5\" holds 20x                         " Ther Activity                        Gait Training                        Modalities                          Access Code: [email or web address]  URL: [email or web address]/  Date: 04/18/2023  Prepared by: Narcisa Starks    Exercises  - Supine Quadricep Sets  - 1 x daily - 7 x weekly - 2 sets - 10 reps - 5 secs hold  - Gluteal Sets  - 1 x daily - 7 x weekly - 2 sets - 10 reps - 5 secs hold  - Supine Single Leg Ankle Pumps  - 1 x daily - 7 x weekly - 2 sets - 10 reps  - Active Straight Leg Raise with Quad Set  - 1 x daily - 7 x weekly - 2 sets - 10 reps  - Standing Hip Abduction with Counter Support  - 1 x daily - 7 x weekly - 2 sets - 10 reps

## 2023-05-10 ENCOUNTER — EVALUATION (OUTPATIENT)
Dept: PHYSICAL THERAPY | Facility: CLINIC | Age: 63
End: 2023-05-10

## 2023-05-10 DIAGNOSIS — S76.112A QUADRICEPS TENDON RUPTURE, LEFT, INITIAL ENCOUNTER: Primary | ICD-10-CM

## 2023-05-10 NOTE — PROGRESS NOTES
PT Re-Evaluation     Today's date: 5/10/2023  Patient name: Michelle Sultana  : 1960  MRN: [id number]  Referring provider: Mark Tatum MD  Dx:   Encounter Diagnosis     ICD-10-CM    1  Quadriceps tendon rupture, left, initial encounter  S76 112A                      Assessment  Assessment details: Initial evaluation: Michelle Sultana is an 63 y o  male arriving to clinic following a quadriceps tendon repair of LLE performed on 2023 for rehab  Patient arrives ambulating with bilateral axillary crutches and lateral hinge brace locked into extension at 0  Patient's pain is well controlled, no s/s associated with infection or DVT, and incision shows good signs of healing  Patient's L knee ROM measured from 0 - 50 degrees passively, and does exhibit ability to perform quad set at 50%  Due to current deficits, patient is limited functionally with ADL's, recreational activities, work-related activities, engaging in social activities, ambulation, stair negotiation, lifting/carrying, transfers  Patient has been educated in post-op contraindications / precautions, home exercise program and plan of care  Patient would benefit from skilled physical therapy services to address their aforementioned functional limitations and progress towards prior level of function and independence with home exercise program     Re-evaluation (05/10/2023): Patient arriving at clinic today for re-evaluation of left knee following a quadriceps tendon repair of LLE performed on 2023  Patient has been consistent with attendance to therapy, motivated during each treatment session, and shows compliance with HEP  At this time patient continues to wear hinge brace locked at 0 degrees for ambulation, and is restricted to 0-90 degrees of PROM into knee flexion as per protocol  Focus has been on proximal hip strengthening, calf strengthening, as well as neuromuscular re education of left quadriceps   Patient would benefit from continued skilled physical therapy at this time  Impairments: abnormal gait, abnormal or restricted ROM, activity intolerance, impaired balance, impaired physical strength, pain with function, weight-bearing intolerance and poor body mechanics    Symptom irritability: moderateUnderstanding of Dx/Px/POC: good   Prognosis: good    Goals  Short term goals: 4 weeks  1  Improve knee range of motion to 90 degrees as per protocol  2  Patient to ambulate without crutches with brace donned  3  Patient to perform SLR without extensor lag  4  Patient to reduce pain to 6/10 at its worst to improve activity tolerance    Long term goals: 12 weeks  1  Patient to wean out of lateral hinge brace for solid surface ambulation  2  Patient to improve knee flexion to 135 degrees to improve stair negotiation  3  Patient to negotiate stairs with reciprocal pattern  4  Patient to reduce pain to 1/10 at its worst to improve QOL and return to function      Plan  Patient would benefit from: skilled physical therapy  Planned modality interventions: TENS, unattended electrical stimulation, thermotherapy: hydrocollator packs and cryotherapy  Planned therapy interventions: abdominal trunk stabilization, flexibility, functional ROM exercises, home exercise program, therapeutic exercise, therapeutic activities, stretching, strengthening, self care, postural training, patient education, neuromuscular re-education, massage, manual therapy and joint mobilization  Frequency: 2x week  Duration in weeks: 12  Treatment plan discussed with: patient        Subjective Evaluation    History of Present Illness  Mechanism of injury: Initial Eval: Patient reports on 03/25/2023 he was carrying a large box down the stairs in his home when he missed a step causing the box to fall on his left leg  Patient notes it felt like he broke his leg  Patient drove himself to the ED where they performed imaging that was unremarkable and patient was referred to ortho   Patient saw Dr Oseas Wilson who sent patient out for an MRI where they discovered a quadriceps tear  Patient was then referred to Dr Erik French for surgery that was performed on 2023  Following surgery patient was placed in a brace and was NWB  At follow up with the surgeon patient was cleared for WBAT with brace locked into extension  Patient notes at this time pain is well controlled with medications however he does have flair ups  Re-eval (05/10/2023): Patient reports since initial evaluation he has experienced improvements in levels of pain, general functional mobility, and flexibility in knee within post operative protocols  Patient notes he continues to wear his brace locked at 0 degrees with all levels of ambulation at this time  Recurrent probem    Pain  Current pain ratin  At best pain ratin  At worst pain ratin  Quality: sharp, throbbing, knife-like, discomfort, dull ache and pressure  Relieving factors: medications and ice  Progression: improved    Social Support  Steps to enter house: yes  Stairs in house: yes   Lives in: multiple-level home  Lives with: significant other    Employment status: working (Disability)    Diagnostic Tests  MRI studies: abnormal  Treatments  No previous or current treatments  Patient Goals  Patient goals for therapy: increased strength, decreased pain, increased motion, return to sport/leisure activities and return to work          Objective  MMT:    Right  Left    Hip Flexion:   5/5  4-/5  Hip Abduction:   5/5  4-/5  Hip Adduction:   5/5  5/5  Hip Extension:   5/5  3+/5  Knee Flexion:   5/5  NT/5  Knee Extension:  5/5  NT/5  Ankle Dorsiflexion:  5/5  5/5  Ankle Plantarflexion:  5/5  5/5    AROM:    Right  Left    Knee Flexion:   140  90  Knee Extension:  0  0    INCISION:  Closed    AMBULATION:  Patient ambulates with lateral hinge brace locked at 0, and single axillary   WBAT    SPECIAL TESTS:  NT 2/2 post op visit           Precautions:   Past Medical History: "  Diagnosis Date   • Hyperlipidemia    • Shoulder abrasion     injured at work  11/6/21     See attached protocol: Quadriceps tendon repair    DOS: 03/31/2023    Date: 05/04/2023 05/08/2023 05/10/2023 04/26/2023 05/01/2023   Visit # 6 7 8 RE 4 5   Manuals        PROM as per protocol TC TC TC (0 - 90)  TC   MRE ABD/ADD with brace donned 20x each ABD/ADD with brace donned 20x each ABD/ADD with brace donned 20x each  ABD/ADD with brace donned 20x each   Cues for quad set 20x    10x           Neuro Re-Ed        SLR with brace locked (flex/abd) 10x each 10x each 20x each  ---   Prone leg extension 10x --- 10x     Standing hip abduction/ext w/ TB With YTB 2x10 R/L brace locked With YTB 2x10 R/L brace locked With RTB 20x with left and 20x with right  No TB 20x with left and 10x with right   Bridges on PB 20x 20x 20x  20x                           Ther Ex        Weight shifting  30x   20   Heel slides with PT to ROM per protocol With PB 20x with cues for hamstrings With PB 20x With PB 20x to 90 degrees  With PB 20x   Heel raises 20x at bar with quad set 20x at bar 20x at bar  20x at bar with quad set   Quad set 5\" holds 20x 5\" holds 20x 5\" holds 20x  5\" holds 20x   Hamstring curl with PB   10x                                     Ther Activity                        Gait Training                        Modalities                            Access Code: [email or web address]  URL: [email or web address]  Date: 04/18/2023  Prepared by: Rod Maricao    Exercises  - Supine Quadricep Sets  - 1 x daily - 7 x weekly - 2 sets - 10 reps - 5 secs hold  - Gluteal Sets  - 1 x daily - 7 x weekly - 2 sets - 10 reps - 5 secs hold  - Supine Single Leg Ankle Pumps  - 1 x daily - 7 x weekly - 2 sets - 10 reps  - Active Straight Leg Raise with Quad Set  - 1 x daily - 7 x weekly - 2 sets - 10 reps  - Standing Hip Abduction with Counter Support  - 1 x daily - 7 x weekly - 2 sets - 10 reps        "

## 2023-05-16 ENCOUNTER — OFFICE VISIT (OUTPATIENT)
Dept: PHYSICAL THERAPY | Facility: CLINIC | Age: 63
End: 2023-05-16

## 2023-05-16 DIAGNOSIS — S76.112A QUADRICEPS TENDON RUPTURE, LEFT, INITIAL ENCOUNTER: Primary | ICD-10-CM

## 2023-05-16 NOTE — PROGRESS NOTES
Daily Note     Today's date: 2023  Patient name: Mele Spear  : 1960  MRN: [id number]  Referring provider: Tierra Fernandez MD  Dx:   Encounter Diagnosis     ICD-10-CM    1  Quadriceps tendon rupture, left, initial encounter  F61 049I                      Subjective: Patient reports no new complains, stating that he has continued to follow post op protocol      Objective: See treatment diary below      Assessment: Tolerated treatment well  Continued to progress patient's ROM, achieved 100 degrees today  Advised patient on protocol progression however will wait to advance patient to next phase until after MD visit this Thursday  Patient demonstrated fatigue post treatment, exhibited good technique with therapeutic exercises and would benefit from continued PT      Plan: Continue per plan of care        Precautions:   Past Medical History:   Diagnosis Date   • Hyperlipidemia    • Shoulder abrasion     injured at work  23     See attached protocol: Quadriceps tendon repair    DOS: 2023    Date: 2023 2023 05/10/2023 2023 2023   Visit # 6 7 8 RE 9 5   Manuals        PROM as per protocol TC TC TC (0 - 90) TC (0 - 100) TC   MRE ABD/ADD with brace donned 20x each ABD/ADD with brace donned 20x each ABD/ADD with brace donned 20x each ABD/ADD with brace donned 20x each ABD/ADD with brace donned 20x each   Cues for quad set 20x    10x           Neuro Re-Ed        SLR with brace locked (flex/abd) 10x each 10x each 20x each 20x each ---   Prone leg extension 10x --- 10x 10x    Standing hip abduction/ext w/ TB With YTB 2x10 R/L brace locked With YTB 2x10 R/L brace locked With RTB 20x with left and 20x with right With RTB 20x with left and 20x with right No TB 20x with left and 10x with right   Bridges on PB 20x 20x 20x 20x 20x                           Ther Ex        Weight shifting  30x   20   Heel slides with PT to ROM per protocol With PB 20x with cues for hamstrings With PB "20x With PB 20x to 90 degrees With PB 20x to 90 degrees With PB 20x   Heel raises 20x at bar with quad set 20x at bar 20x at bar 20x at bar 20x at bar with quad set   Quad set 5\" holds 20x 5\" holds 20x 5\" holds 20x 5\" holds 20x 5\" holds 20x   Hamstring curl with PB   10x 10x                                    Ther Activity                        Gait Training                        Modalities                            Access Code: [email or web address]  URL: [email or web address]  Date: 04/18/2023  Prepared by: Kamari Cuellar    Exercises  - Supine Quadricep Sets  - 1 x daily - 7 x weekly - 2 sets - 10 reps - 5 secs hold  - Gluteal Sets  - 1 x daily - 7 x weekly - 2 sets - 10 reps - 5 secs hold  - Supine Single Leg Ankle Pumps  - 1 x daily - 7 x weekly - 2 sets - 10 reps  - Active Straight Leg Raise with Quad Set  - 1 x daily - 7 x weekly - 2 sets - 10 reps  - Standing Hip Abduction with Counter Support  - 1 x daily - 7 x weekly - 2 sets - 10 reps          "

## 2023-05-18 ENCOUNTER — OFFICE VISIT (OUTPATIENT)
Dept: PHYSICAL THERAPY | Facility: CLINIC | Age: 63
End: 2023-05-18

## 2023-05-18 ENCOUNTER — APPOINTMENT (OUTPATIENT)
Dept: PHYSICAL THERAPY | Facility: CLINIC | Age: 63
End: 2023-05-18
Payer: COMMERCIAL

## 2023-05-18 ENCOUNTER — OFFICE VISIT (OUTPATIENT)
Dept: OBGYN CLINIC | Facility: CLINIC | Age: 63
End: 2023-05-18

## 2023-05-18 VITALS
BODY MASS INDEX: 29.06 KG/M2 | WEIGHT: 203 LBS | HEIGHT: 70 IN | SYSTOLIC BLOOD PRESSURE: 119 MMHG | HEART RATE: 75 BPM | DIASTOLIC BLOOD PRESSURE: 68 MMHG

## 2023-05-18 DIAGNOSIS — S76.112A QUADRICEPS TENDON RUPTURE, LEFT, INITIAL ENCOUNTER: Primary | ICD-10-CM

## 2023-05-18 NOTE — PROGRESS NOTES
"Orthopaedics Office Visit - Post-op Patient Visit    ASSESSMENT/PLAN:    Assessment:   7 weeks s/p left quadriceps tendon repair   DOS 3/31/23   Progressing well overall     Plan:   · Physical exam performed today and subjective history obtained  Patient is demonstrating overall improvement since his last visit  · New referral placed today for patient to continue Physical Therapy plan of care, may initiate walking with brace set 0-60 of flexion, initiate gait training  · May alternate Tylenol and Advil as needed, especially on days with Physical Therapy appointment, to aid in pain reduction  · May remove TROM brace to sleep at night  · Follow up in 4 weeks for re-evaluation    To Do Next Visit:  Re-evaluation    _____________________________________________________  CHIEF COMPLAINT:  Chief Complaint   Patient presents with   • Left Leg - Post-op         SUBJECTIVE:  Xiomara Washington is a 66 y o  male who presents 7 weeks s/p left quadriceps tendon repair,  DOS 3/31/23  He is actively completing Physical Therapy where he notes good progress and soreness after sessions  He is completing range of motion exercises up to 104 degrees of flexion  He states he has been experiencing aching pain of the knee that is improving over time  He states he has been compliant in taking aspirin twice daily for DVT prophylaxis  He has been weight bearing as tolerated with his TROM locked in full extension  He has walked without the TROM brace around his house, he notes sensation of instability with this  He is taking Tylenol as needed for pain relief       PAST MEDICAL HISTORY:  Past Medical History:   Diagnosis Date   • Hyperlipidemia    • Shoulder abrasion     injured at work  11/6/21       PAST SURGICAL HISTORY:  Past Surgical History:   Procedure Laterality Date   • CARDIAC SURGERY      had a cardiac cath done 5/2021 at [facility]- showed \" slight \" blockage   • MOHS SURGERY      basal cell ca of nose   • MOUTH SURGERY     • NERVE " BLOCK Right 10/14/2016    Procedure: LUMBAR TRANSFORAMINAL EPIDURAL L4 AND L5;  Surgeon: Kelsi Pope MD;  Location: [facility] MAIN OR;  Service:    • OK SURGICAL ARTHROSCOPY SHOULDER W/ROTATOR CUFF RPR Right 11/18/2021    Procedure: REPAIR ROTATOR CUFF  ARTHROSCOPIC, BICEPS TENODESIS, SUBACROMIAL DECOMPRESSION;  Surgeon: Cruzito Pina MD;  Location: [address] [place] [address];  Service: Orthopedics   • OK SUTURE QUADRICEPS/HAMSTRING RUPTURE PRIMARY Left 3/31/2023    Procedure: REPAIR TENDON LEFT QUADRICEPS;  Surgeon: Brett Sánchez MD;  Location: [address] [place] [address];  Service: Orthopedics       FAMILY HISTORY:  History reviewed  No pertinent family history  SOCIAL HISTORY:  Social History     Tobacco Use   • Smoking status: Former   • Smokeless tobacco: Current   • Tobacco comments:     Vape   Substance Use Topics   • Alcohol use: No   • Drug use: No       MEDICATIONS:    Current Outpatient Medications:   •  multivitamin-iron-minerals-folic acid (CENTRUM) chewable tablet, Chew 1 tablet daily, Disp: , Rfl:   •  rosuvastatin (CRESTOR) 20 MG tablet, Take 20 mg by mouth daily at bedtime, Disp: , Rfl:   •  oxyCODONE (Roxicodone) 5 immediate release tablet, Take 1 tablet (5 mg total) by mouth every 12 (twelve) hours as needed for moderate pain Max Daily Amount: 10 mg, Disp: 15 tablet, Rfl: 0  •  RA Aspirin EC 81 MG EC tablet, Take 1 tablet (81 mg total) by mouth every 12 (twelve) hours for 28 days Last dose 3/30/23- ok with surgeon, Disp: 56 tablet, Rfl: 0    ALLERGIES:  No Known Allergies    REVIEW OF SYSTEMS:  MSK: left knee pain  Neuro: WNL  Pertinent items are otherwise noted in HPI  A comprehensive review of systems was otherwise negative      LABS:  HgA1c: No results found for: HGBA1C  BMP:   Lab Results   Component Value Date    CALCIUM 8 8 03/29/2023    K 4 6 03/29/2023    CO2 27 03/29/2023     03/29/2023    BUN 18 03/29/2023    CREATININE 0 91 03/29/2023     CBC: No components found for: "CBC    _____________________________________________________  PHYSICAL EXAMINATION:  Vital signs: /68   Pulse 75   Ht 5' 10\" (1 778 m)   Wt 92 1 kg (203 lb)   BMI 29 13 kg/m²   General: No acute distress, awake and alert  Psychiatric: Mood and affect appear appropriate  HEENT: Trachea Midline, No torticollis, no apparent facial trauma  Cardiovascular: No audible murmurs; Extremities appear perfused  Pulmonary: No audible wheezing or stridor  Skin: No open lesions; see further details (if any) below    MUSCULOSKELETAL EXAMINATION:  Extremities:  Left knee  · Patient sits comfortably in the office today with his knee resting at 90 degrees of flexion  · Well healed anterior surgical incision, sutures previously removed  · No erythema, drainage, edema, ecchymosis  · Able to achieve active range of motion 5-100 degrees   Sensation intact in DP/SP/Hsu/Sa/T nerve distributions  2+ DP & PT pulses  · Brisk capillary refill in all toes      _____________________________________________________  STUDIES REVIEWED:  I personally reviewed the images and interpretation is as follows: No new imaging to review    PROCEDURES PERFORMED:  Procedures   No procedures performed today      Scribe Attestation    I,:  Berenice Graves am acting as a scribe while in the presence of the attending physician :       I,:  Breanna Hanley MD personally performed the services described in this documentation    as scribed in my presence  :           "

## 2023-05-18 NOTE — PROGRESS NOTES
Daily Note     Today's date: 2023  Patient name: Abby Tejeda  : 1960  MRN: [id number]  Referring provider: Simon De La Torre MD  Dx:   Encounter Diagnosis     ICD-10-CM    1  Quadriceps tendon rupture, left, initial encounter  V89 438G                      Subjective: Patient reports that he had a follow up with his MD who was pleased with his progress, and advised patient to continue through protocol as directed  Objective: See treatment diary below      Assessment: Tolerated treatment well  Advanced patient to include SAQ/LAQ with good quad control as well as ambulation with brace unlocked to 60 degrees, all tolerated well  Patient demonstrated fatigue post treatment, exhibited good technique with therapeutic exercises and would benefit from continued PT      Plan: Continue per plan of care        Precautions:   Past Medical History:   Diagnosis Date   • Hyperlipidemia    • Shoulder abrasion     injured at work  23     See attached protocol: Quadriceps tendon repair    DOS: 2023    Date: 2023 2023 05/10/2023 2023 2023   Visit # 6 7 8 RE 9 10   Manuals        PROM as per protocol TC TC TC (0 - 90) TC (0 - 100) TC (0-114)   MRE ABD/ADD with brace donned 20x each ABD/ADD with brace donned 20x each ABD/ADD with brace donned 20x each ABD/ADD with brace donned 20x each SLR, D1/D2   Cues for quad set 20x               Neuro Re-Ed        SAQ     #0 2x10   LAQ     #0 10x   SLR with brace locked (flex/abd) 10x each 10x each 20x each 20x each ---   Prone leg extension 10x --- 10x 10x    Standing hip abduction/ext w/ TB With YTB 2x10 R/L brace locked With YTB 2x10 R/L brace locked With RTB 20x with left and 20x with right With RTB 20x with left and 20x with right No TB 20x with left and 10x with right   Bridges on PB 20x 20x 20x 20x 20x                           Ther Ex        Weight shifting  30x   20   Heel slides with PT to ROM per protocol With PB 20x with cues for "hamstrings With PB 20x With PB 20x to 90 degrees With PB 20x to 90 degrees With PB 20x   Heel raises 20x at bar with quad set 20x at bar 20x at bar 20x at bar 20x at bar with quad set   Quad set 5\" holds 20x 5\" holds 20x 5\" holds 20x 5\" holds 20x 5\" holds 20x   Hamstring curl with PB   10x 10x    Gait     Brace unlocked to 0-60 degrees                           Ther Activity                        Gait Training                        Modalities                            Access Code: [email or web address]  URL: [email or web address]  Date: 04/18/2023  Prepared by: Gopal Morataya    Exercises  - Supine Quadricep Sets  - 1 x daily - 7 x weekly - 2 sets - 10 reps - 5 secs hold  - Gluteal Sets  - 1 x daily - 7 x weekly - 2 sets - 10 reps - 5 secs hold  - Supine Single Leg Ankle Pumps  - 1 x daily - 7 x weekly - 2 sets - 10 reps  - Active Straight Leg Raise with Quad Set  - 1 x daily - 7 x weekly - 2 sets - 10 reps  - Standing Hip Abduction with Counter Support  - 1 x daily - 7 x weekly - 2 sets - 10 reps          "

## 2023-05-22 ENCOUNTER — OFFICE VISIT (OUTPATIENT)
Dept: PHYSICAL THERAPY | Facility: CLINIC | Age: 63
End: 2023-05-22

## 2023-05-22 DIAGNOSIS — S76.112A QUADRICEPS TENDON RUPTURE, LEFT, INITIAL ENCOUNTER: Primary | ICD-10-CM

## 2023-05-22 NOTE — PROGRESS NOTES
Daily Note     Today's date: 2023  Patient name: Conner Haque  : 1960  MRN: [id number]  Referring provider: Natalia Maki MD  Dx:   Encounter Diagnosis     ICD-10-CM    1  Quadriceps tendon rupture, left, initial encounter  L82 447P                      Subjective: Patient reports some low back soreness, noting increased walking may have aggravated his low back  Patient notes no recent increased pain  Objective: See treatment diary below      Assessment: Tolerated treatment well  Continued to advance patient as tolerated within protocol  Patient demonstrated fatigue post treatment, exhibited good technique with therapeutic exercises and would benefit from continued PT      Plan: Continue per plan of care        Precautions:   Past Medical History:   Diagnosis Date   • Hyperlipidemia    • Shoulder abrasion     injured at work  23     See attached protocol: Quadriceps tendon repair    DOS: 2023    Date: 2023 2023 05/10/2023 2023 2023   Visit # 11 7 8 RE 9 10   Manuals        PROM as per protocol TC TC TC (0 - 90) TC (0 - 100) TC (0-114)   MRE SLR, D1/D2 ABD/ADD with brace donned 20x each ABD/ADD with brace donned 20x each ABD/ADD with brace donned 20x each SLR, D1/D2   Cues for quad set                Neuro Re-Ed        SAQ #0 2x10    #0 2x10   LAQ #0 10x    #0 10x   SLR with brace locked (flex/abd) 2x10 no brace with quad set 10x each 20x each 20x each ---   Prone leg extension  --- 10x 10x    Standing hip abduction/ext w/ TB YTB 2x10 brace unlocked With YTB 2x10 R/L brace locked With RTB 20x with left and 20x with right With RTB 20x with left and 20x with right No TB 20x with left and 10x with right   Bridges on PB 20x 20x 20x 20x 20x                           Ther Ex        Weight shifting  30x   20   STS 10x from hi-low with brace at 60*       Heel slides with PT to ROM per protocol With PB 20x with cues for hamstrings With PB 20x With PB 20x to 90 "degrees With PB 20x to 90 degrees With PB 20x   Heel raises 20x at bar with quad set 20x at bar 20x at bar 20x at bar 20x at bar with quad set   Quad set 5\" holds 20x 5\" holds 20x 5\" holds 20x 5\" holds 20x 5\" holds 20x   Hamstring curl with PB 10x  10x 10x    Gait     Brace unlocked to 0-60 degrees                           Ther Activity                        Gait Training                        Modalities                            Access Code: [email or web address]  URL: [email or web address]/  Date: 04/18/2023  Prepared by: Rick Rhodes    Exercises  - Supine Quadricep Sets  - 1 x daily - 7 x weekly - 2 sets - 10 reps - 5 secs hold  - Gluteal Sets  - 1 x daily - 7 x weekly - 2 sets - 10 reps - 5 secs hold  - Supine Single Leg Ankle Pumps  - 1 x daily - 7 x weekly - 2 sets - 10 reps  - Active Straight Leg Raise with Quad Set  - 1 x daily - 7 x weekly - 2 sets - 10 reps  - Standing Hip Abduction with Counter Support  - 1 x daily - 7 x weekly - 2 sets - 10 reps          "

## 2023-05-24 ENCOUNTER — OFFICE VISIT (OUTPATIENT)
Dept: PHYSICAL THERAPY | Facility: CLINIC | Age: 63
End: 2023-05-24

## 2023-05-24 DIAGNOSIS — S76.112A QUADRICEPS TENDON RUPTURE, LEFT, INITIAL ENCOUNTER: Primary | ICD-10-CM

## 2023-05-24 NOTE — PROGRESS NOTES
"Daily Note     Today's date: 2023  Patient name: Jeimy Akins  : 1960  MRN: [id number]  Referring provider: Maggy Raygoza MD  Dx:   Encounter Diagnosis     ICD-10-CM    1  Quadriceps tendon rupture, left, initial encounter  D44 505R                      Subjective: Patient reports his low back is feeling better, and states that his knee was slightly sore after last session however ice helped  Objective: See treatment diary below      Assessment: Tolerated treatment well  Continued to advance patient as tolerated within protocol  Patient demonstrated fatigue post treatment, exhibited good technique with therapeutic exercises and would benefit from continued PT      Plan: Continue per plan of care        Precautions:   Past Medical History:   Diagnosis Date   • Hyperlipidemia    • Shoulder abrasion     injured at work  23     See attached protocol: Quadriceps tendon repair    DOS: 2023    Date: 2023 2023 05/10/2023 2023 2023   Visit # 11 12 8 RE 9 10   Manuals        PROM as per protocol TC TC TC (0 - 90) TC (0 - 100) TC (0-114)   MRE SLR, D1/D2 SLR, D1/D2 ABD/ADD with brace donned 20x each ABD/ADD with brace donned 20x each SLR, D1/D2   Cues for quad set                Neuro Re-Ed        SAQ #0 2x10    #0 2x10   LAQ #0 10x #3 2x10 with focus on top quad set   #0 10x   SLR with brace locked (flex/abd) 2x10 no brace with quad set 2x10 no brace 20x each 20x each ---   Prone leg extension  --- 10x 10x    Standing hip abduction/ext w/ TB YTB 2x10 brace unlocked YTB 2x10 brace unlocked With RTB 20x with left and 20x with right With RTB 20x with left and 20x with right No TB 20x with left and 10x with right   Bridges on PB 20x 20x 20x 20x 20x                           Ther Ex        Weight shifting     20   Step taps  4\" 20x      FSU  4\" 10x with       STS 10x from hi-low with brace at 60* 10x from hi-low with brace at 60*      Heel slides with PT to ROM per " "protocol With PB 20x with cues for hamstrings With PB 20x with cues for hamstrings With PB 20x to 90 degrees With PB 20x to 90 degrees With PB 20x   Heel raises 20x at bar with quad set 20x at bar 20x at bar 20x at bar 20x at bar with quad set   Quad set 5\" holds 20x 5\" holds 20x 5\" holds 20x 5\" holds 20x 5\" holds 20x   Hamstring curl with PB 10x  10x 10x    Gait     Brace unlocked to 0-60 degrees                           Ther Activity                        Gait Training                        Modalities                            Access Code: [email or web address]  URL: [email or web address]  Date: 04/18/2023  Prepared by: Martha Schumacher    Exercises  - Supine Quadricep Sets  - 1 x daily - 7 x weekly - 2 sets - 10 reps - 5 secs hold  - Gluteal Sets  - 1 x daily - 7 x weekly - 2 sets - 10 reps - 5 secs hold  - Supine Single Leg Ankle Pumps  - 1 x daily - 7 x weekly - 2 sets - 10 reps  - Active Straight Leg Raise with Quad Set  - 1 x daily - 7 x weekly - 2 sets - 10 reps  - Standing Hip Abduction with Counter Support  - 1 x daily - 7 x weekly - 2 sets - 10 reps          "

## 2023-05-30 ENCOUNTER — OFFICE VISIT (OUTPATIENT)
Dept: PHYSICAL THERAPY | Facility: CLINIC | Age: 63
End: 2023-05-30

## 2023-05-30 DIAGNOSIS — S76.112A QUADRICEPS TENDON RUPTURE, LEFT, INITIAL ENCOUNTER: Primary | ICD-10-CM

## 2023-05-30 NOTE — PROGRESS NOTES
"Daily Note     Today's date: 2023  Patient name: Wade Diane  : 1960  MRN: [id number]  Referring provider: Walker Cadena MD  Dx:   Encounter Diagnosis     ICD-10-CM    1  Quadriceps tendon rupture, left, initial encounter  Y46 252W                      Subjective: Patient reports that his low back pain has really flared up in pain, noting that he had a similar problem a few years ago due to 2x herniated discs at which time he received an injection that was very helpful  Patient notes he will be following up with Dr Alana Fowler on Thursday for his low back  Objective: See treatment diary below      Assessment: Tolerated treatment well  Worked today within pain limites of low back, ROM in knee measured at 0-132  Patient demonstrated fatigue post treatment, exhibited good technique with therapeutic exercises and would benefit from continued PT      Plan: Continue per plan of care        Precautions:   Past Medical History:   Diagnosis Date   • Hyperlipidemia    • Shoulder abrasion     injured at work  23     See attached protocol: Quadriceps tendon repair    DOS: 2023    Date: 2023   Visit # 11 12 13 9 10   Manuals        PROM as per protocol TC TC TC 0 - 132 TC (0 - 100) TC (0-114)   MRE SLR, D1/D2 SLR, D1/D2 SLR, D1/D2 ABD/ADD with brace donned 20x each SLR, D1/D2   Cues for quad set                Neuro Re-Ed        SAQ #0 2x10  #0 2x10  #0 2x10   LAQ #0 10x #3 2x10 with focus on top quad set #0 2x10 with 3\" holds  #0 10x   SLR with brace locked (flex/abd) 2x10 no brace with quad set 2x10 no brace 20x each no brace 20x each ---   Prone leg extension  ---  10x    Standing hip abduction/ext w/ TB YTB 2x10 brace unlocked YTB 2x10 brace unlocked  With RTB 20x with left and 20x with right No TB 20x with left and 10x with right   Bridges on PB 20x 20x  20x 20x                           Ther Ex        Weight shifting     20   Step taps  4\" " "20x      FSU  4\" 10x with  4\" x10  6\" x10     STS 10x from hi-low with brace at 60* 10x from hi-low with brace at 60* 10x from hi-low with brace at 60*     Heel slides with PT to ROM per protocol With PB 20x with cues for hamstrings With PB 20x with cues for hamstrings  With PB 20x to 90 degrees With PB 20x   Heel raises 20x at bar with quad set 20x at bar 20x at bar 20x at bar 20x at bar with quad set   Quad set 5\" holds 20x 5\" holds 20x  5\" holds 20x 5\" holds 20x   Hamstring curl with PB 10x   10x    Gait     Brace unlocked to 0-60 degrees                           Ther Activity                        Gait Training                        Modalities                            Access Code: [email or web address]  URL: [email or web address]  Date: 04/18/2023  Prepared by: Karen Cabrera    Exercises  - Supine Quadricep Sets  - 1 x daily - 7 x weekly - 2 sets - 10 reps - 5 secs hold  - Gluteal Sets  - 1 x daily - 7 x weekly - 2 sets - 10 reps - 5 secs hold  - Supine Single Leg Ankle Pumps  - 1 x daily - 7 x weekly - 2 sets - 10 reps  - Active Straight Leg Raise with Quad Set  - 1 x daily - 7 x weekly - 2 sets - 10 reps  - Standing Hip Abduction with Counter Support  - 1 x daily - 7 x weekly - 2 sets - 10 reps          "

## 2023-05-31 ENCOUNTER — TELEPHONE (OUTPATIENT)
Dept: OBGYN CLINIC | Facility: CLINIC | Age: 63
End: 2023-05-31

## 2023-05-31 NOTE — TELEPHONE ENCOUNTER
Lvm for pt that he needs to get scheduled with Spine and pain  According to Dr Nancy Weston last ovn his MRI showed Budging disks and he was referred to spine and pain  Dr Scott Ladd is primary sports medicine , he would not be the appropriate provider  Please schedule with Dr Arsen Hannah or Calista Francisco      Will try patient again later today before appointment is canceled

## 2023-06-01 ENCOUNTER — OFFICE VISIT (OUTPATIENT)
Dept: PHYSICAL THERAPY | Facility: CLINIC | Age: 63
End: 2023-06-01

## 2023-06-01 DIAGNOSIS — S76.112A QUADRICEPS TENDON RUPTURE, LEFT, INITIAL ENCOUNTER: Primary | ICD-10-CM

## 2023-06-01 NOTE — PROGRESS NOTES
"Daily Note     Today's date: 2023  Patient name: Dana Rapp  : 1960  MRN: [id number]  Referring provider: Geraldine Fink MD  Dx:   Encounter Diagnosis     ICD-10-CM    1  Quadriceps tendon rupture, left, initial encounter  C25 675J                      Subjective: Patient reports his low back is feeling a bit better, and states that he will be going to see pain management next week  Patient states that his knee has feeling good stating no increased pain with increased activity  Objective: See treatment diary below      Assessment: Tolerated treatment well  Knee ROM measured at 0-135, continued to progress patient as per protocol with no increase in low back pain  Patient demonstrated fatigue post treatment, exhibited good technique with therapeutic exercises and would benefit from continued PT      Plan: Continue per plan of care        Precautions:   Past Medical History:   Diagnosis Date   • Hyperlipidemia    • Shoulder abrasion     injured at work  23     See attached protocol: Quadriceps tendon repair    DOS: 2023    Date: 2023   Visit # 11 12 13 14 10   Manuals        PROM as per protocol TC TC TC 0 - 132 TC (0 - 135) TC (0-114)   MRE SLR, D1/D2 SLR, D1/D2 SLR, D1/D2 SLR, D1/D2 SLR, D1/D2   Cues for quad set                Neuro Re-Ed        SAQ #0 2x10  #0 2x10 #0 2x10 #0 2x10   LAQ #0 10x #3 2x10 with focus on top quad set #0 2x10 with 3\" holds #0 2x10 with 3\" holds #0 10x   SLR with brace locked (flex/abd) 2x10 no brace with quad set 2x10 no brace 20x each no brace 20x each no brace ---   Prone leg extension  ---      Standing hip abduction/ext w/ TB YTB 2x10 brace unlocked YTB 2x10 brace unlocked  With RTB 20x with left and 20x with right No TB 20x with left and 10x with right   Bridges on PB 20x 20x  20x 20x                           Ther Ex        Weight shifting     20   Step taps  4\" 20x      FSU  4\" 10x with  4\" " "x10  6\" x10 10x 6\"    STS 10x from hi-low with brace at 60* 10x from hi-low with brace at 60* 10x from hi-low with brace at 60* TRX mini squats with brace 10x    Heel slides with PT to ROM per protocol With PB 20x with cues for hamstrings With PB 20x with cues for hamstrings   With PB 20x   Heel raises 20x at bar with quad set 20x at bar 20x at bar 20x at bar 20x at bar with quad set   Quad set 5\" holds 20x 5\" holds 20x   5\" holds 20x   Hamstring curl with PB 10x   20x    Gait     Brace unlocked to 0-60 degrees                           Ther Activity                        Gait Training                        Modalities                            Access Code: [email or web address]  URL: [email or web address]  Date: 04/18/2023  Prepared by: Girtha Seip    Exercises  - Supine Quadricep Sets  - 1 x daily - 7 x weekly - 2 sets - 10 reps - 5 secs hold  - Gluteal Sets  - 1 x daily - 7 x weekly - 2 sets - 10 reps - 5 secs hold  - Supine Single Leg Ankle Pumps  - 1 x daily - 7 x weekly - 2 sets - 10 reps  - Active Straight Leg Raise with Quad Set  - 1 x daily - 7 x weekly - 2 sets - 10 reps  - Standing Hip Abduction with Counter Support  - 1 x daily - 7 x weekly - 2 sets - 10 reps          "

## 2023-06-05 ENCOUNTER — APPOINTMENT (OUTPATIENT)
Dept: PHYSICAL THERAPY | Facility: CLINIC | Age: 63
End: 2023-06-05
Payer: COMMERCIAL

## 2023-06-05 ENCOUNTER — OFFICE VISIT (OUTPATIENT)
Dept: PAIN MEDICINE | Facility: CLINIC | Age: 63
End: 2023-06-05
Payer: COMMERCIAL

## 2023-06-05 ENCOUNTER — TELEPHONE (OUTPATIENT)
Dept: PAIN MEDICINE | Facility: CLINIC | Age: 63
End: 2023-06-05

## 2023-06-05 VITALS
HEART RATE: 67 BPM | TEMPERATURE: 98.2 F | SYSTOLIC BLOOD PRESSURE: 150 MMHG | DIASTOLIC BLOOD PRESSURE: 78 MMHG | WEIGHT: 200 LBS | BODY MASS INDEX: 28.7 KG/M2

## 2023-06-05 DIAGNOSIS — G89.29 CHRONIC LEFT-SIDED LOW BACK PAIN WITH LEFT-SIDED SCIATICA: ICD-10-CM

## 2023-06-05 DIAGNOSIS — G89.4 CHRONIC PAIN SYNDROME: Primary | ICD-10-CM

## 2023-06-05 DIAGNOSIS — M51.36 LUMBAR DEGENERATIVE DISC DISEASE: ICD-10-CM

## 2023-06-05 DIAGNOSIS — M54.42 CHRONIC LEFT-SIDED LOW BACK PAIN WITH LEFT-SIDED SCIATICA: ICD-10-CM

## 2023-06-05 DIAGNOSIS — M54.16 LUMBAR RADICULOPATHY: ICD-10-CM

## 2023-06-05 PROCEDURE — 99204 OFFICE O/P NEW MOD 45 MIN: CPT | Performed by: ANESTHESIOLOGY

## 2023-06-05 RX ORDER — ASPIRIN 81 MG/1
81 TABLET, CHEWABLE ORAL DAILY
COMMUNITY

## 2023-06-05 NOTE — H&P (VIEW-ONLY)
Assessment:  1  Chronic pain syndrome    2  Chronic left-sided low back pain with left-sided sciatica    3  Lumbar radiculopathy    4  Lumbar degenerative disc disease        Plan:  Orders Placed This Encounter   Procedures   • MRI lumbar spine without contrast     Standing Status:   Future     Standing Expiration Date:   6/5/2027     Scheduling Instructions: There is no preparation for this test  Please leave your jewelry and valuables at home, wedding rings are the exception  All patients will be required to change into a hospital gown and pants  Street clothes are not permitted in the MRI  Magnetic nail polish must be removed prior to arrival for your test  Please bring your insurance cards, a form of photo ID and a list of your medications with you  Arrive 15 minutes prior to your appointment time in order to register  Please bring any prior CT or MRI studies of this area that were not performed at a [facility]  To schedule this appointment, please contact Central Scheduling at [phone]  Prior to your appointment, please make sure you complete the MRI Screening Form when you e-Check in for your appointment  This will be available starting 7 days before your appointment in [address]  You may receive an e-mail with an activation code if you do not have a [employer] account  If you do not have access to a device, we will complete your screening at your appointment  Order Specific Question:   What is the patient's sedation requirement? Answer:   No Sedation     Order Specific Question:   Does this procedure require the 3T MRI at [facility] or [place]?     Answer:   No     Order Specific Question:   Release to patient through [employer]     Answer:   Immediate     Order Specific Question:   Is order priority selected as STAT?      Answer:   No     Order Specific Question:   Reason for Exam (FREE TEXT)     Answer:   lumbar radiculopathy       New Medications Ordered This Visit Medications   • aspirin 81 mg chewable tablet     Sig: Chew 81 mg daily     My impressions and treatment recommendations were discussed in detail with the patient, who verbalized understanding and had no further questions  The patient is reporting pain primarily in his low back and left lower extremity to about the level of his left knee and what appears to be the left L5 and S1 distributions  He does report undergoing epidural steroid injections in the past with excellent pain relief for years  At this point, since he did get good relief for years following his previous epidural steroid injection, I did feel it reasonable to offer the patient a left L5 and S1 transforaminal epidural steroid injection since this could be potentially therapeutic  The procedures, its risks, and benefits were explained in detail to the patient  Risks include but are not limited to bleeding, infection, hematoma formation, abscess formation, weakness, headache, failure the pain to improve, nerve irritation or damage, and potential worsening of the pain  The patient verbalized understanding and wished to proceed with the procedure  The patient also reports that his pain has worsened after recent fall  His previous MRI is from 2021 for his fall  At this point, I did feel it reasonable to have the patient undergo a new MRI of the lumbar spine to evaluate for any pathology that may be contributing to his pain complaints  Follow-up is planned in 4 weeks time or sooner as warranted  Discharge instructions were provided  I personally saw and examined the patient and I agree with the above discussed plan of care  History of Present Illness:    Chapito Cerrato is a 66 y o  male who presents to [facility] for initial evaluation of the above stated pain complaints  The patient has a past medical and chronic pain history as outlined in the assessment section  He was referred by Dr Jl Cuellar      The patient reports an 8-year history of low back pain and left lower extremity radiculopathy and what appears to be the left L5 and S1 distribution  He states that his pain is moderate to severe and 10 out of 10 on the verbal numerical pain rating scale  He does report a fall injury that precipitated his current symptoms on March 25, 2023  He describes his pain is constant in nature without any typical pattern  He describes his pain as shooting, numbness, sharp, tightness, and stabbing  Ambulates without any assistive devices  Prior, lying down, standing, bending, sitting, walking, exercise, relaxation, coughing, sneezing, and bowel movements increases pain  There are no pain relieving factors  Patient does report undergoing a L4-L5 lumbar transforaminal epidural steroid injection in the past   Nerve injections did provide excellent pain relief for the last 7 years  The patient is currently on blood thinning medications in the form of aspirin 81 mg  Acetaminophen, aspirin, ibuprofen, IcyHot, and Aleve are currently being used  Hydrocodone, oxycodone, OxyContin, and tramadol were used in the past     Review of Systems:    Review of Systems   Constitutional: Negative for chills and fatigue  HENT: Negative for ear pain, mouth sores and sinus pressure  Eyes: Negative for pain, redness and visual disturbance  Respiratory: Negative for shortness of breath and wheezing  Cardiovascular: Negative for chest pain and palpitations  Gastrointestinal: Negative for abdominal pain and nausea  Endocrine: Negative for polyphagia  Musculoskeletal: Positive for back pain and gait problem  Negative for arthralgias and neck pain  Muscle pain, Joint stiffness, joint pain, Decreased Rom   Skin: Negative for wound  Neurological: Positive for weakness and numbness  Negative for seizures  Psychiatric/Behavioral: Positive for sleep disturbance  Negative for dysphoric mood             Past Medical History: "  Diagnosis Date   • Hyperlipidemia    • Shoulder abrasion     injured at work  11/6/21       Past Surgical History:   Procedure Laterality Date   • CARDIAC SURGERY      had a cardiac cath done 5/2021 at [facility]- showed \" slight \" blockage   • MOHS SURGERY      basal cell ca of nose   • MOUTH SURGERY     • NERVE BLOCK Right 10/14/2016    Procedure: LUMBAR TRANSFORAMINAL EPIDURAL L4 AND L5;  Surgeon: Angelina Craft MD;  Location: [facility] MAIN OR;  Service:    • AR SURGICAL ARTHROSCOPY SHOULDER W/ROTATOR CUFF RPR Right 11/18/2021    Procedure: REPAIR ROTATOR CUFF  ARTHROSCOPIC, BICEPS TENODESIS, SUBACROMIAL DECOMPRESSION;  Surgeon: Vignesh Alfaro MD;  Location: [address] [place] [address];  Service: Orthopedics   • AR SUTURE QUADRICEPS/HAMSTRING RUPTURE PRIMARY Left 3/31/2023    Procedure: REPAIR TENDON LEFT QUADRICEPS;  Surgeon: Mary Veliz MD;  Location: [address] [place] [address];  Service: Orthopedics       History reviewed  No pertinent family history      Social History     Occupational History   • Not on file   Tobacco Use   • Smoking status: Former   • Smokeless tobacco: Current   • Tobacco comments:     Vape   Substance and Sexual Activity   • Alcohol use: No   • Drug use: No   • Sexual activity: Not on file         Current Outpatient Medications:   •  aspirin 81 mg chewable tablet, Chew 81 mg daily, Disp: , Rfl:   •  multivitamin-iron-minerals-folic acid (CENTRUM) chewable tablet, Chew 1 tablet daily, Disp: , Rfl:   •  rosuvastatin (CRESTOR) 20 MG tablet, Take 20 mg by mouth daily at bedtime, Disp: , Rfl:   •  oxyCODONE (Roxicodone) 5 immediate release tablet, Take 1 tablet (5 mg total) by mouth every 12 (twelve) hours as needed for moderate pain Max Daily Amount: 10 mg, Disp: 15 tablet, Rfl: 0  •  RA Aspirin EC 81 MG EC tablet, Take 1 tablet (81 mg total) by mouth every 12 (twelve) hours for 28 days Last dose 3/30/23- ok with surgeon, Disp: 56 tablet, Rfl: 0    No Known Allergies    Physical Exam:    /78   Pulse " 67   Temp 98 2 °F (36 8 °C)   Wt 90 7 kg (200 lb)   BMI 28 70 kg/m²     Constitutional: normal, well developed, well nourished, alert, in no distress and non-toxic and no overt pain behavior  Eyes: anicteric  HEENT: grossly intact  Neck: supple, symmetric, trachea midline and no masses   Pulmonary:even and unlabored  Cardiovascular:No edema or pitting edema present  Skin:Normal without rashes or lesions and well hydrated  Psychiatric:Mood and affect appropriate  Neurologic:Cranial Nerves II-XII grossly intact  Musculoskeletal:normal     Lumbar Spine Exam    Appearance:  Normal lordosis  Palpation/Tenderness:  no tenderness or spasm  Sensory:  no sensory deficits noted  Range of Motion:  Flexion:   Moderately limited  with pain  Extension:  Moderately limited  with pain  Lateral Flexion - Left:  Moderately limited  with pain  Lateral Flexion - Right:  Moderately limited  with pain  Rotation - Left:  Moderately limited  with pain  Rotation - Right:  Moderately limited  with pain  Motor Strength:  Left hip flexion:  5/5  Left hip extension:  5/5  Right hip flexion:  5/5  Right hip extension:  5/5  Left knee flexion:  5/5  Left knee extension:  5/5  Right knee flexion:  5/5  Right knee extension:  5/5  Left foot dorsiflexion:  5/5  Left foot plantar flexion:  5/5  Right foot dorsiflexion:  5/5  Right foot plantar flexion:  5/5  Reflexes:  Left Patellar:  2+   Right Patellar:  2+   Left Achilles:  2+   Right Achilles:  2+   Special Tests:  Left Straight Leg Test:  negative  Right Straight Leg Test:  negative  Left Joseph's Maneuver:  negative  Right Joseph's Maneuver:  negative    Imaging     Media Information      Document Information    Other:  Other   mri report of lumbar spine   10/11/2016   Attached To:   Registration on 10/11/16     [address] [place] [address]         MRI lumbar spine without contrast    (Results Pending)       Orders Placed This Encounter   Procedures • MRI lumbar spine without contrast

## 2023-06-05 NOTE — PROGRESS NOTES
Assessment:  1  Chronic pain syndrome    2  Chronic left-sided low back pain with left-sided sciatica    3  Lumbar radiculopathy    4  Lumbar degenerative disc disease        Plan:  Orders Placed This Encounter   Procedures   • MRI lumbar spine without contrast     Standing Status:   Future     Standing Expiration Date:   6/5/2027     Scheduling Instructions: There is no preparation for this test  Please leave your jewelry and valuables at home, wedding rings are the exception  All patients will be required to change into a hospital gown and pants  Street clothes are not permitted in the MRI  Magnetic nail polish must be removed prior to arrival for your test  Please bring your insurance cards, a form of photo ID and a list of your medications with you  Arrive 15 minutes prior to your appointment time in order to register  Please bring any prior CT or MRI studies of this area that were not performed at a [facility]  To schedule this appointment, please contact Central Scheduling at [phone]  Prior to your appointment, please make sure you complete the MRI Screening Form when you e-Check in for your appointment  This will be available starting 7 days before your appointment in [address]  You may receive an e-mail with an activation code if you do not have a [email or web address] account  If you do not have access to a device, we will complete your screening at your appointment  Order Specific Question:   What is the patient's sedation requirement? Answer:   No Sedation     Order Specific Question:   Does this procedure require the 3T MRI at [facility] or [facility]?     Answer:   No     Order Specific Question:   Release to patient through [employer]     Answer:   Immediate     Order Specific Question:   Is order priority selected as STAT?      Answer:   No     Order Specific Question:   Reason for Exam (FREE TEXT)     Answer:   lumbar radiculopathy       New Medications Ordered This Visit Medications   • aspirin 81 mg chewable tablet     Sig: Chew 81 mg daily     My impressions and treatment recommendations were discussed in detail with the patient, who verbalized understanding and had no further questions  The patient is reporting pain primarily in his low back and left lower extremity to about the level of his left knee and what appears to be the left L5 and S1 distributions  He does report undergoing epidural steroid injections in the past with excellent pain relief for years  At this point, since he did get good relief for years following his previous epidural steroid injection, I did feel it reasonable to offer the patient a left L5 and S1 transforaminal epidural steroid injection since this could be potentially therapeutic  The procedures, its risks, and benefits were explained in detail to the patient  Risks include but are not limited to bleeding, infection, hematoma formation, abscess formation, weakness, headache, failure the pain to improve, nerve irritation or damage, and potential worsening of the pain  The patient verbalized understanding and wished to proceed with the procedure  The patient also reports that his pain has worsened after recent fall  His previous MRI is from 2021 for his fall  At this point, I did feel it reasonable to have the patient undergo a new MRI of the lumbar spine to evaluate for any pathology that may be contributing to his pain complaints  Follow-up is planned in 4 weeks time or sooner as warranted  Discharge instructions were provided  I personally saw and examined the patient and I agree with the above discussed plan of care  History of Present Illness:    Meera Penn is a 66 y o  male who presents to [facility] for initial evaluation of the above stated pain complaints  The patient has a past medical and chronic pain history as outlined in the assessment section  He was referred by Dr Sonny Benitez      The patient reports an 8-year history of low back pain and left lower extremity radiculopathy and what appears to be the left L5 and S1 distribution  He states that his pain is moderate to severe and 10 out of 10 on the verbal numerical pain rating scale  He does report a fall injury that precipitated his current symptoms on March 25, 2023  He describes his pain is constant in nature without any typical pattern  He describes his pain as shooting, numbness, sharp, tightness, and stabbing  Ambulates without any assistive devices  Prior, lying down, standing, bending, sitting, walking, exercise, relaxation, coughing, sneezing, and bowel movements increases pain  There are no pain relieving factors  Patient does report undergoing a L4-L5 lumbar transforaminal epidural steroid injection in the past   Nerve injections did provide excellent pain relief for the last 7 years  The patient is currently on blood thinning medications in the form of aspirin 81 mg  Acetaminophen, aspirin, ibuprofen, IcyHot, and Aleve are currently being used  Hydrocodone, oxycodone, OxyContin, and tramadol were used in the past     Review of Systems:    Review of Systems   Constitutional: Negative for chills and fatigue  HENT: Negative for ear pain, mouth sores and sinus pressure  Eyes: Negative for pain, redness and visual disturbance  Respiratory: Negative for shortness of breath and wheezing  Cardiovascular: Negative for chest pain and palpitations  Gastrointestinal: Negative for abdominal pain and nausea  Endocrine: Negative for polyphagia  Musculoskeletal: Positive for back pain and gait problem  Negative for arthralgias and neck pain  Muscle pain, Joint stiffness, joint pain, Decreased Rom   Skin: Negative for wound  Neurological: Positive for weakness and numbness  Negative for seizures  Psychiatric/Behavioral: Positive for sleep disturbance  Negative for dysphoric mood             Past Medical History: "  Diagnosis Date   • Hyperlipidemia    • Shoulder abrasion     injured at work  11/6/21       Past Surgical History:   Procedure Laterality Date   • CARDIAC SURGERY      had a cardiac cath done 5/2021 at [facility]- showed \" slight \" blockage   • MOHS SURGERY      basal cell ca of nose   • MOUTH SURGERY     • NERVE BLOCK Right 10/14/2016    Procedure: LUMBAR TRANSFORAMINAL EPIDURAL L4 AND L5;  Surgeon: Adelita De Leon MD;  Location: [facility] MAIN OR;  Service:    • TN SURGICAL ARTHROSCOPY SHOULDER W/ROTATOR CUFF RPR Right 11/18/2021    Procedure: REPAIR ROTATOR CUFF  ARTHROSCOPIC, BICEPS TENODESIS, SUBACROMIAL DECOMPRESSION;  Surgeon: Thony Thrasher MD;  Location: [address] [place] [address];  Service: Orthopedics   • TN SUTURE QUADRICEPS/HAMSTRING RUPTURE PRIMARY Left 3/31/2023    Procedure: REPAIR TENDON LEFT QUADRICEPS;  Surgeon: Bill Diana MD;  Location: [address] [place] [address];  Service: Orthopedics       History reviewed  No pertinent family history      Social History     Occupational History   • Not on file   Tobacco Use   • Smoking status: Former   • Smokeless tobacco: Current   • Tobacco comments:     Vape   Substance and Sexual Activity   • Alcohol use: No   • Drug use: No   • Sexual activity: Not on file         Current Outpatient Medications:   •  aspirin 81 mg chewable tablet, Chew 81 mg daily, Disp: , Rfl:   •  multivitamin-iron-minerals-folic acid (CENTRUM) chewable tablet, Chew 1 tablet daily, Disp: , Rfl:   •  rosuvastatin (CRESTOR) 20 MG tablet, Take 20 mg by mouth daily at bedtime, Disp: , Rfl:   •  oxyCODONE (Roxicodone) 5 immediate release tablet, Take 1 tablet (5 mg total) by mouth every 12 (twelve) hours as needed for moderate pain Max Daily Amount: 10 mg, Disp: 15 tablet, Rfl: 0  •  RA Aspirin EC 81 MG EC tablet, Take 1 tablet (81 mg total) by mouth every 12 (twelve) hours for 28 days Last dose 3/30/23- ok with surgeon, Disp: 56 tablet, Rfl: 0    No Known Allergies    Physical Exam:    /78   Pulse " 67   Temp 98 2 °F (36 8 °C)   Wt 90 7 kg (200 lb)   BMI 28 70 kg/m²     Constitutional: normal, well developed, well nourished, alert, in no distress and non-toxic and no overt pain behavior  Eyes: anicteric  HEENT: grossly intact  Neck: supple, symmetric, trachea midline and no masses   Pulmonary:even and unlabored  Cardiovascular:No edema or pitting edema present  Skin:Normal without rashes or lesions and well hydrated  Psychiatric:Mood and affect appropriate  Neurologic:Cranial Nerves II-XII grossly intact  Musculoskeletal:normal     Lumbar Spine Exam    Appearance:  Normal lordosis  Palpation/Tenderness:  no tenderness or spasm  Sensory:  no sensory deficits noted  Range of Motion:  Flexion:   Moderately limited  with pain  Extension:  Moderately limited  with pain  Lateral Flexion - Left:  Moderately limited  with pain  Lateral Flexion - Right:  Moderately limited  with pain  Rotation - Left:  Moderately limited  with pain  Rotation - Right:  Moderately limited  with pain  Motor Strength:  Left hip flexion:  5/5  Left hip extension:  5/5  Right hip flexion:  5/5  Right hip extension:  5/5  Left knee flexion:  5/5  Left knee extension:  5/5  Right knee flexion:  5/5  Right knee extension:  5/5  Left foot dorsiflexion:  5/5  Left foot plantar flexion:  5/5  Right foot dorsiflexion:  5/5  Right foot plantar flexion:  5/5  Reflexes:  Left Patellar:  2+   Right Patellar:  2+   Left Achilles:  2+   Right Achilles:  2+   Special Tests:  Left Straight Leg Test:  negative  Right Straight Leg Test:  negative  Left Joseph's Maneuver:  negative  Right Joseph's Maneuver:  negative    Imaging     Media Information      Document Information    Other:  Other   mri report of lumbar spine   10/11/2016   Attached To:   Registration on 10/11/16     [address] [place] [address]         MRI lumbar spine without contrast    (Results Pending)       Orders Placed This Encounter   Procedures • MRI lumbar spine without contrast

## 2023-06-05 NOTE — TELEPHONE ENCOUNTER
Scheduled patient for TFESI 6/23/23  Patient is taking ASA 81mg  Nothing to eat or drink 1 hour prior to procedure  Needs to arrange transportation  Proper clothing for procedure  No vaccines 2 weeks prior or after procedure  If ill or place on antibiotics, please call to reschedule

## 2023-06-07 ENCOUNTER — OFFICE VISIT (OUTPATIENT)
Dept: PHYSICAL THERAPY | Facility: CLINIC | Age: 63
End: 2023-06-07
Payer: COMMERCIAL

## 2023-06-07 DIAGNOSIS — S76.112A QUADRICEPS TENDON RUPTURE, LEFT, INITIAL ENCOUNTER: Primary | ICD-10-CM

## 2023-06-07 PROCEDURE — 97112 NEUROMUSCULAR REEDUCATION: CPT | Performed by: PHYSICAL MEDICINE & REHABILITATION

## 2023-06-07 PROCEDURE — 97110 THERAPEUTIC EXERCISES: CPT | Performed by: PHYSICAL MEDICINE & REHABILITATION

## 2023-06-07 PROCEDURE — 97140 MANUAL THERAPY 1/> REGIONS: CPT | Performed by: PHYSICAL MEDICINE & REHABILITATION

## 2023-06-07 NOTE — PROGRESS NOTES
"Daily Note     Today's date: 2023  Patient name: Yordy Levin  : 1960  MRN: [id number]  Referring provider: Jeremy Kruse MD  Dx:   Encounter Diagnosis     ICD-10-CM    1  Quadriceps tendon rupture, left, initial encounter  [id number] 297Z                      Subjective: Patient reports that his knee is continuing to improve daily  Patient states however due to instability it feels difficult to walk quickly or negotiate stairs with step through pattern  Objective: See treatment diary below      Assessment: Tolerated treatment well  Progressed patient's exercises today, patient was challenged appropriately  Patient demonstrated fatigue post treatment, exhibited good technique with therapeutic exercises and would benefit from continued PT      Plan: Continue per plan of care        Precautions:   Past Medical History:   Diagnosis Date   • Hyperlipidemia    • Shoulder abrasion     injured at work  23     See attached protocol: Quadriceps tendon repair    DOS: 2023    Date: 2023   Visit # 11 12 13 14 15   Manuals        PROM as per protocol TC TC TC 0 - 132 TC (0 - 135) TC (0-135)   MRE SLR, D1/D2 SLR, D1/D2 SLR, D1/D2 SLR, D1/D2 SLR, D1/D2   Cues for quad set                Neuro Re-Ed        SAQ #0 2x10  #0 2x10 #0 2x10    LAQ #0 10x #3 2x10 with focus on top quad set #0 2x10 with 3\" holds #0 2x10 with 3\" holds #3 2x10 with focus on top quad set   SLR with brace locked (flex/abd) 2x10 no brace with quad set 2x10 no brace 20x each no brace 20x each no brace ---   Prone leg extension  ---      Standing hip abduction/ext w/ TB YTB 2x10 brace unlocked YTB 2x10 brace unlocked  With RTB 20x with left and 20x with right With RTB 20x with left and 20x with right   Bridges on PB 20x 20x  20x 20x                           Ther Ex        Weight shifting        Step taps  4\" 20x      FSU  4\" 10x with  4\" x10  6\" x10 10x 6\" 10x 6\"   Step forwards     10x " "4\"   STS 10x from hi-low with brace at 60* 10x from hi-low with brace at 60* 10x from hi-low with brace at 60* TRX mini squats with brace 10x TRX mini squats with brace 10x   Heel slides with PT to ROM per protocol With PB 20x with cues for hamstrings With PB 20x with cues for hamstrings      Heel raises 20x at bar with quad set 20x at bar 20x at bar 20x at bar 20x at bar with quad set   Quad set 5\" holds 20x 5\" holds 20x      Hamstring curl with PB 10x   20x    Gait                                Ther Activity                        Gait Training                        Modalities                            Access Code: [email or web address]  URL: [email or web address]  Date: 04/18/2023  Prepared by: Mayito Belfield    Exercises  - Supine Quadricep Sets  - 1 x daily - 7 x weekly - 2 sets - 10 reps - 5 secs hold  - Gluteal Sets  - 1 x daily - 7 x weekly - 2 sets - 10 reps - 5 secs hold  - Supine Single Leg Ankle Pumps  - 1 x daily - 7 x weekly - 2 sets - 10 reps  - Active Straight Leg Raise with Quad Set  - 1 x daily - 7 x weekly - 2 sets - 10 reps  - Standing Hip Abduction with Counter Support  - 1 x daily - 7 x weekly - 2 sets - 10 reps          "

## 2023-06-09 ENCOUNTER — TELEPHONE (OUTPATIENT)
Dept: PHYSICAL THERAPY | Facility: OTHER | Age: 63
End: 2023-06-09

## 2023-06-09 NOTE — TELEPHONE ENCOUNTER
Patient called into [facility] and left a v/m on 06/09/23 @2:59pm     Returned patient's call ion 06/09/23 @3:18pm     V/M left for patient call back  Phone number and hours of business provided  ( not sure if patient is trying to get in contact with PM, he is established for Lumbar/spine) I also provided PM number just in case  NO [facility] REF

## 2023-06-12 ENCOUNTER — APPOINTMENT (OUTPATIENT)
Dept: PHYSICAL THERAPY | Facility: CLINIC | Age: 63
End: 2023-06-12
Payer: COMMERCIAL

## 2023-06-14 ENCOUNTER — TELEPHONE (OUTPATIENT)
Dept: RADIOLOGY | Facility: CLINIC | Age: 63
End: 2023-06-14

## 2023-06-14 ENCOUNTER — OFFICE VISIT (OUTPATIENT)
Dept: PHYSICAL THERAPY | Facility: CLINIC | Age: 63
End: 2023-06-14
Payer: COMMERCIAL

## 2023-06-14 DIAGNOSIS — S76.112A QUADRICEPS TENDON RUPTURE, LEFT, INITIAL ENCOUNTER: Primary | ICD-10-CM

## 2023-06-14 PROCEDURE — 97110 THERAPEUTIC EXERCISES: CPT | Performed by: PHYSICAL MEDICINE & REHABILITATION

## 2023-06-14 PROCEDURE — 97112 NEUROMUSCULAR REEDUCATION: CPT | Performed by: PHYSICAL MEDICINE & REHABILITATION

## 2023-06-14 PROCEDURE — 97140 MANUAL THERAPY 1/> REGIONS: CPT | Performed by: PHYSICAL MEDICINE & REHABILITATION

## 2023-06-14 NOTE — PROGRESS NOTES
"Daily Note     Today's date: 2023  Patient name: Hernan Rincon  : 1960  MRN: [id number]  Referring provider: Rosina Salvador MD  Dx:   Encounter Diagnosis     ICD-10-CM    1  Quadriceps tendon rupture, left, initial encounter  J33 100M                      Subjective: Patient reports that his knee is feeling good, stating some intermittent soreness however not severe  Patient states he is pleased with his progress  Patient notes that he will be seeing his MD tomorrow    Objective: See treatment diary below      Assessment: Tolerated treatment well  Limited with proximal hip strengthening because of low back pain today, however continued to focus on functional quadriceps strengthening patient has continued to progress well  Patient demonstrated fatigue post treatment, exhibited good technique with therapeutic exercises and would benefit from continued PT      Plan: Continue per plan of care        Precautions:   Past Medical History:   Diagnosis Date   • Hyperlipidemia    • Shoulder abrasion     injured at work  23     See attached protocol: Quadriceps tendon repair    DOS: 2023    Date: 2023   Visit # 16 12 13 14 15   Manuals        PROM as per protocol TC TC TC 0 - 132 TC (0 - 135) TC (0-135)   MRE SLR, D1/D2 SLR, D1/D2 SLR, D1/D2 SLR, D1/D2 SLR, D1/D2   Cues for quad set                Neuro Re-Ed        SAQ   #0 2x10 #0 2x10    LAQ  #3 2x10 with focus on top quad set #0 2x10 with 3\" holds #0 2x10 with 3\" holds #3 2x10 with focus on top quad set   SLR with brace locked (flex/abd)  2x10 no brace 20x each no brace 20x each no brace ---   Prone leg extension  ---      Standing hip abduction/ext w/ TB  YTB 2x10 brace unlocked  With RTB 20x with left and 20x with right With RTB 20x with left and 20x with right   Bridges on PB NT secondary to LBP 20x  20x 20x                           Ther Ex        TRX squats 20x 0-60       Resisted walking " "#15 retro 10 laps       Step taps  4\" 20x      FSU 6\" 15x 4\" 10x with  4\" x10  6\" x10 10x 6\" 10x 6\"   Step forwards 4\" 10x controlled descent    10x 4\"   STS From 21\" 2x10 no UE support 10x from hi-low with brace at 60* 10x from hi-low with brace at 60* TRX mini squats with brace 10x TRX mini squats with brace 10x   Heel slides with PT to ROM per protocol Through full available range 10x with OP With PB 20x with cues for hamstrings      Heel raises  20x at bar 20x at bar 20x at bar 20x at bar with quad set   Quad set  5\" holds 20x      Hamstring curl with PB 20x   20x    Gait                                Ther Activity                        Gait Training                        Modalities                            Access Code: [email or web address]  URL: [email or web address]  Date: 04/18/2023  Prepared by: Bennett Morgan    Exercises  - Supine Quadricep Sets  - 1 x daily - 7 x weekly - 2 sets - 10 reps - 5 secs hold  - Gluteal Sets  - 1 x daily - 7 x weekly - 2 sets - 10 reps - 5 secs hold  - Supine Single Leg Ankle Pumps  - 1 x daily - 7 x weekly - 2 sets - 10 reps  - Active Straight Leg Raise with Quad Set  - 1 x daily - 7 x weekly - 2 sets - 10 reps  - Standing Hip Abduction with Counter Support  - 1 x daily - 7 x weekly - 2 sets - 10 reps          "

## 2023-06-14 NOTE — TELEPHONE ENCOUNTER
----- Message from Tone Goodrich MD sent at 6/14/2023 12:49 PM EDT -----  Regarding: MRI L-spine result  MRI of the lumbar spine shows moderate spinal stenosis at L4-L5 with a right-sided disc herniation at this level  There is also mild spinal stenosis at L2-L3 and L3-L4  There are lesser degree disc bulges/herniations at T12-L1 and L5-S1  Based on this information I think we should proceed with the left L5 and S1 transforaminal epidural steroid injections that we had planned at his office visit

## 2023-06-15 ENCOUNTER — OFFICE VISIT (OUTPATIENT)
Dept: OBGYN CLINIC | Facility: CLINIC | Age: 63
End: 2023-06-15

## 2023-06-15 VITALS
BODY MASS INDEX: 29.92 KG/M2 | WEIGHT: 209 LBS | HEIGHT: 70 IN | DIASTOLIC BLOOD PRESSURE: 78 MMHG | SYSTOLIC BLOOD PRESSURE: 135 MMHG | HEART RATE: 65 BPM

## 2023-06-15 DIAGNOSIS — S76.112A QUADRICEPS TENDON RUPTURE, LEFT, INITIAL ENCOUNTER: Primary | ICD-10-CM

## 2023-06-15 PROCEDURE — 99024 POSTOP FOLLOW-UP VISIT: CPT | Performed by: ORTHOPAEDIC SURGERY

## 2023-06-15 RX ORDER — NAPROXEN 500 MG/1
500 TABLET ORAL 2 TIMES DAILY WITH MEALS
Qty: 60 TABLET | Refills: 0 | Status: SHIPPED | OUTPATIENT
Start: 2023-06-15

## 2023-06-15 NOTE — PATIENT INSTRUCTIONS
- Weight bearing as tolerated left lower extremity  - continue physical therapy and brace use    - Transition from TROM brace at this time  - script placed in system for naproxen   - Over the counter analgesics as needed / directed   - Ice / heat as directed   - Follow up 4 weeks

## 2023-06-15 NOTE — PROGRESS NOTES
Orthopaedics Office Visit - Post-op Patient Visit    ASSESSMENT/PLAN:    Assessment:   11 weeks s/p left quadriceps tendon repair   DOS 3/31/23   Quadriceps atrophy   No pain, some weakness      Plan:   - Weight bearing as tolerated left lower extremity  - continue physical therapy and brace use  - begin strengthening phase   - Transition away from TROM brace at this time  - script placed in system for naproxen   - Over the counter analgesics as needed / directed   - Ice / heat as directed   - Follow up 4 weeks       To Do Next Visit:  Evaluate knee pain     _____________________________________________________  CHIEF COMPLAINT:  Chief Complaint   Patient presents with   • Left Leg - Post-op         SUBJECTIVE:  Hernan Rincon is a 66 y o  male who presents 11 weeks s/p left quadriceps tendon repair  Patient states that his knee is doing well overall  Patient states that his main concern is weakness in the left knee  Patient states he has been maintaining his knee brace as directed no complaints  Patient states that he has been participating physical therapy no complaints  Patient denies any new or worsening symptoms in his leg  Patient has been taking Tylenol as needed for pain  Patient offers no other complaints at this time        SOCIAL HISTORY:  Social History     Tobacco Use   • Smoking status: Former   • Smokeless tobacco: Current   • Tobacco comments:     Vape   Substance Use Topics   • Alcohol use: No   • Drug use: No       MEDICATIONS:    Current Outpatient Medications:   •  multivitamin-iron-minerals-folic acid (CENTRUM) chewable tablet, Chew 1 tablet daily, Disp: , Rfl:   •  rosuvastatin (CRESTOR) 20 MG tablet, Take 20 mg by mouth daily at bedtime, Disp: , Rfl:   •  aspirin 81 mg chewable tablet, Chew 81 mg daily, Disp: , Rfl:   •  oxyCODONE (Roxicodone) 5 immediate release tablet, Take 1 tablet (5 mg total) by mouth every 12 (twelve) hours as needed for moderate pain Max Daily Amount: 10 mg, Disp: "15 tablet, Rfl: 0  •  RA Aspirin EC 81 MG EC tablet, Take 1 tablet (81 mg total) by mouth every 12 (twelve) hours for 28 days Last dose 3/30/23- ok with surgeon, Disp: 56 tablet, Rfl: 0    REVIEW OF SYSTEMS:  MSK: left knee pain   Neuro: WNL   Pertinent items are otherwise noted in HPI  A comprehensive review of systems was otherwise negative     _____________________________________________________  PHYSICAL EXAMINATION:  Vital signs: /78   Pulse 65   Ht 5' 10\" (1 778 m)   Wt 94 8 kg (209 lb)   BMI 29 99 kg/m²   General: No acute distress, awake and alert  Psychiatric: Mood and affect appear appropriate  HEENT: Trachea Midline, No torticollis, no apparent facial trauma  Cardiovascular: No audible murmurs; Extremities appear perfused  Pulmonary: No audible wheezing or stridor  Skin: No open lesions; see further details (if any) below      MUSCULOSKELETAL EXAMINATION:  Left knee examination:  - Patient sitting comfortably in the office in no acute distress   -Healed incision site over the anterior aspect of the left knee  Mild discoloration anterior   - Minimal tender palpation over the anterior aspect of the knee  -Quadriceps atrophy present on the left lower extremity  Able to perform straight leg raise without extensor lag  - NV intact    _____________________________________________________  STUDIES REVIEWED:  I personally reviewed the images and interpretation is as follows:  N/A         PROCEDURES PERFORMED:  No procedures were performed at this time  Alma Chavez PA-C - assisting  Naila Argueta MD                    Portions of the record may have been created with voice recognition software  Occasional wrong word or \"sound a like\" substitutions may have occurred due to the inherent limitations of voice recognition software  Read the chart carefully and recognize, using context, where substitutions have occurred      "

## 2023-06-19 ENCOUNTER — OFFICE VISIT (OUTPATIENT)
Dept: PHYSICAL THERAPY | Facility: CLINIC | Age: 63
End: 2023-06-19
Payer: COMMERCIAL

## 2023-06-19 DIAGNOSIS — S76.112A QUADRICEPS TENDON RUPTURE, LEFT, INITIAL ENCOUNTER: Primary | ICD-10-CM

## 2023-06-19 PROCEDURE — 97140 MANUAL THERAPY 1/> REGIONS: CPT | Performed by: PHYSICAL MEDICINE & REHABILITATION

## 2023-06-19 PROCEDURE — 97112 NEUROMUSCULAR REEDUCATION: CPT | Performed by: PHYSICAL MEDICINE & REHABILITATION

## 2023-06-19 PROCEDURE — 97110 THERAPEUTIC EXERCISES: CPT | Performed by: PHYSICAL MEDICINE & REHABILITATION

## 2023-06-19 NOTE — PROGRESS NOTES
"Daily Note     Today's date: 2023  Patient name: Oriana Zavala  : 1960  MRN: [id number]  Referring provider: Annita Leigh MD  Dx:   Encounter Diagnosis     ICD-10-CM    1  Quadriceps tendon rupture, left, initial encounter  Z02 132W                      Subjective: Patient reports his MD was pleased with his progress, noting he is cleared to continue to progress through protocol  Objective: See treatment diary below      Assessment: Tolerated treatment well  Patient challenged with advancement of exercises  Patient demonstrated fatigue post treatment, exhibited good technique with therapeutic exercises and would benefit from continued PT      Plan: Continue per plan of care        Precautions:   Past Medical History:   Diagnosis Date   • Hyperlipidemia    • Shoulder abrasion     injured at work  23     See attached protocol: Quadriceps tendon repair    DOS: 2023    Date: 2023   Visit # 12 17 13 14 15   Manuals        PROM as per protocol TC TC TC 0 - 132 TC (0 - 135) TC (0-135)   MRE SLR, D1/D2 SLR, D1/D2 SLR, D1/D2 SLR, D1/D2 SLR, D1/D2   Cues for quad set                Neuro Re-Ed        SAQ   #0 2x10 #0 2x10    LAQ  #5 2x10 with focus on top quad set #0 2x10 with 3\" holds #0 2x10 with 3\" holds #3 2x10 with focus on top quad set   SLR with brace locked (flex/abd)  2x10 no brace 20x each no brace 20x each no brace ---   Prone leg extension  ---      Standing hip abduction/ext w/ TB  YTB 2x10  With RTB 20x with left and 20x with right With RTB 20x with left and 20x with right   Bridges on PB NT secondary to LBP 20x  20x 20x                           Ther Ex        TRX squats 20x 0-60 20x      TRX lunges  10x R/L      Resisted walking #15 retro 10 laps #15 retro 10 laps      Step taps  6\" 20x      FSU 6\" 15x 6\" 15x with  4\" x10  6\" x10 10x 6\" 10x 6\"   Step forwards 4\" 10x controlled descent 6\" 10x controlled descent   10x 4\"   STS " "From 21\" 2x10 no UE support 10x from hi-low with brace at 60* 10x from hi-low with brace at 60* TRX mini squats with brace 10x TRX mini squats with brace 10x   Heel slides with PT to ROM per protocol Through full available range 10x with OP With PB 20x with cues for hamstrings      Heel raises  20x at bar 20x at bar 20x at bar 20x at bar with quad set   Quad set  5\" holds 20x      Hamstring curl with PB 20x 20x  20x    Gait                                Ther Activity                        Gait Training                        Modalities        Ice  10 min                  Access Code: [email or web address]  URL: [email or web address]  Date: 04/18/2023  Prepared by: Francis Thao    Exercises  - Supine Quadricep Sets  - 1 x daily - 7 x weekly - 2 sets - 10 reps - 5 secs hold  - Gluteal Sets  - 1 x daily - 7 x weekly - 2 sets - 10 reps - 5 secs hold  - Supine Single Leg Ankle Pumps  - 1 x daily - 7 x weekly - 2 sets - 10 reps  - Active Straight Leg Raise with Quad Set  - 1 x daily - 7 x weekly - 2 sets - 10 reps  - Standing Hip Abduction with Counter Support  - 1 x daily - 7 x weekly - 2 sets - 10 reps          "

## 2023-06-23 ENCOUNTER — HOSPITAL ENCOUNTER (OUTPATIENT)
Facility: AMBULARY SURGERY CENTER | Age: 63
Setting detail: OUTPATIENT SURGERY
Discharge: HOME | End: 2023-06-23
Attending: ANESTHESIOLOGY | Admitting: ANESTHESIOLOGY
Payer: COMMERCIAL

## 2023-06-23 ENCOUNTER — HOSPITAL ENCOUNTER (OUTPATIENT)
Dept: RADIOLOGY | Facility: HOSPITAL | Age: 63
Setting detail: OUTPATIENT SURGERY
Discharge: HOME | End: 2023-06-23
Payer: COMMERCIAL

## 2023-06-23 VITALS
SYSTOLIC BLOOD PRESSURE: 169 MMHG | TEMPERATURE: 97.3 F | RESPIRATION RATE: 20 BRPM | DIASTOLIC BLOOD PRESSURE: 79 MMHG | OXYGEN SATURATION: 97 % | HEART RATE: 60 BPM

## 2023-06-23 DIAGNOSIS — Z92.241 S/P EPIDURAL STEROID INJECTION: ICD-10-CM

## 2023-06-23 PROCEDURE — 64483 NJX AA&/STRD TFRM EPI L/S 1: CPT | Performed by: ANESTHESIOLOGY

## 2023-06-23 PROCEDURE — 64484 NJX AA&/STRD TFRM EPI L/S EA: CPT | Performed by: ANESTHESIOLOGY

## 2023-06-23 RX ORDER — METHYLPREDNISOLONE ACETATE 40 MG/ML
INJECTION, SUSPENSION INTRA-ARTICULAR; INTRALESIONAL; INTRAMUSCULAR; SOFT TISSUE AS NEEDED
Status: DISCONTINUED | OUTPATIENT
Start: 2023-06-23 | End: 2023-06-23 | Stop reason: HOSPADM

## 2023-06-23 RX ORDER — LIDOCAINE WITH 8.4% SOD BICARB 0.9%(10ML)
SYRINGE (ML) INJECTION AS NEEDED
Status: DISCONTINUED | OUTPATIENT
Start: 2023-06-23 | End: 2023-06-23 | Stop reason: HOSPADM

## 2023-06-23 RX ORDER — BUPIVACAINE HYDROCHLORIDE 2.5 MG/ML
INJECTION, SOLUTION EPIDURAL; INFILTRATION; INTRACAUDAL AS NEEDED
Status: DISCONTINUED | OUTPATIENT
Start: 2023-06-23 | End: 2023-06-23 | Stop reason: HOSPADM

## 2023-06-23 NOTE — INTERVAL H&P NOTE
H&P reviewed  After examining the patient I find no changes in the patients condition since the H&P had been written      Vitals:    06/23/23 1002   BP: 143/84   Pulse:    Resp:    Temp:    SpO2:

## 2023-06-23 NOTE — OP NOTE
ATTENDING PHYSICIAN:  Mackenzie Rubio MD     PROCEDURE:  1  Left L5 transforaminal epidural steroid injection under fluoroscopic guidance  2  Left S1 transforaminal epidural steroid injection under fluoroscopic guidance  PRE-PROCEDURE DIAGNOSIS: Low back pain and left lower extremity radiculopathy  POST-PROCEDURE DIAGNOSIS: Low back pain and left lower extremity radiculopathy  ANESTHESIA:  Local     ESTIMATED BLOOD LOSS:  Minimal     COMPLICATIONS:  None  LOCATION:  [address]  CONSENT:  Today's procedure, its potential benefits as well as its risks and potential side effects were reviewed  Discussed risks of the procedure including bleeding, infection, nerve irritation or damage, reactions to the medications, weakness, headache, failure of the pain to improve, and potential worsening of the pain were explained to the patient who verbalized understanding and who wished to proceed  Written informed consent was thereby obtained  DESCRIPTION OF THE PROCEDURE:  After written informed consent was obtained, the patient was taken to the fluoroscopy suite and placed in the prone position  Anatomical landmarks were identified by way of fluoroscopy in multiple views  The skin of the lumbar region was prepped using antiseptic and draped in the usual sterile fashion  Strict aseptic technique was utilized  The skin and subcutaneous tissues at the needle entry site were infiltrated with a total of 5 mL of 1% preservative-free lidocaine using a 25-gauge 1-1/2-inch needle  22-gauge needles were then incrementally advanced under fluoroscopic guidance in the oblique view into the neural foramina as mentioned above  Proper placement into each of the neural foramen was confirmed with fluoroscopy in both the lateral and AP views  After negative aspiration for CSF or heme, contrast was injected, which delineated the nerve roots and the epidural space under fluoroscopy in the AP view  There was only a transient pressure paresthesia that resolved immediately upon injection  After negative aspiration, a 2 mL of a 4 mL injectate consisting of 3 mL of preservative-free 0 25% bupivacaine and 1 mL of Depo-Medrol 80 mg/mL was slowly injected into each of the needles as delineated above  The patient tolerated the procedure well and all needles were removed intact  Hemostasis was maintained  There were no apparent paresthesias or complications  The skin was wiped clean and a Band-Aid was placed as appropriate  The patient was monitored for an appropriate period of time and remained hemodynamically stable following the procedure  The patient was ultimately discharged to home with supervision in good condition and instructed to call the office in approximately 7-10 days with an update or sooner as warranted  Discharge instructions were provided  I was present for and participated in all key and critical portions of this procedure      Aguilar Phelps MD  6/23/2023  10:51 AM

## 2023-06-23 NOTE — DISCHARGE INSTRUCTIONS
Epidural Steroid Injection   WHAT YOU NEED TO KNOW:   An epidural steroid injection (MAMIE) is a procedure to inject steroid medicine into the epidural space  The epidural space is between your spinal cord and vertebrae  Steroids reduce inflammation and fluid buildup in your spine that may be causing pain  You may be given pain medicine along with the steroids  ACTIVITY  Do not drive or operate machinery today  No strenuous activity today - bending, lifting, etc   You may resume normal activites starting tomorrow - start slowly and as tolerated  You may shower today, but no tub baths or hot tubs  You may have numbness for several hours from the local anesthetic  Please use caution and common sense, especially with weight-bearing activities  CARE OF THE INJECTION SITE  If you have soreness or pain, apply ice to the area today (20 minutes on/20 minutes off)  Starting tomorrow, you may use warm, moist heat or ice if needed  You may have an increase or change in your discomfort for 36-48 hours after your treatment  Apply ice and continue with any pain medication you have been prescribed  Notify the [facility] if you have any of the following: redness, drainage, swelling, headache, stiff neck or fever above 100°F     SPECIAL INSTRUCTIONS  Our office will contact you in approximately 7 days for a progress report  MEDICATIONS  Continue to take all routine medications  Our office may have instructed you to hold some medications  As no general anesthesia was used in today's procedure, you should not experience any side effects related to anesthesia  If you are diabetic, the steroids used in today's injection may temporarily increase your blood sugar levels after the first few days after your injection  Please keep a close eye on your sugars and alert the doctor who manages your diabetes if your sugars are significantly high from your baseline or you are symptomatic       If you have a problem specifically related to your procedure, please call our office at [phone]  Problems not related to your procedure should be directed to your primary care physician

## 2023-06-27 ENCOUNTER — PATIENT MESSAGE (OUTPATIENT)
Dept: OBGYN CLINIC | Facility: CLINIC | Age: 63
End: 2023-06-27

## 2023-06-28 ENCOUNTER — OFFICE VISIT (OUTPATIENT)
Dept: PHYSICAL THERAPY | Facility: CLINIC | Age: 63
End: 2023-06-28
Payer: COMMERCIAL

## 2023-06-28 DIAGNOSIS — S76.112A QUADRICEPS TENDON RUPTURE, LEFT, INITIAL ENCOUNTER: Primary | ICD-10-CM

## 2023-06-28 PROCEDURE — 97140 MANUAL THERAPY 1/> REGIONS: CPT | Performed by: PHYSICAL MEDICINE & REHABILITATION

## 2023-06-28 PROCEDURE — 97110 THERAPEUTIC EXERCISES: CPT | Performed by: PHYSICAL MEDICINE & REHABILITATION

## 2023-06-28 NOTE — PROGRESS NOTES
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  Objective: See treatment diary below      Assessment: Tolerated treatment well  Patient exhibited fatigueability in left quad  Patient demonstrated fatigue post treatment, exhibited good technique with therapeutic exercises and would benefit from continued PT      Plan: Continue per plan of care        Precautions:   Past Medical History:   Diagnosis Date   • Hyperlipidemia    • Shoulder abrasion     injured at work  23     See attached protocol: Quadriceps tendon repair    DOS: 2023    Date: 2023   Visit # 12 17 18 14 15   Manuals        PROM as per protocol TC TC TC 0 - 135 TC (0 - 135) TC (0-135)   MRE SLR, D1/D2 SLR, D1/D2 SLR, D1/D2 SLR, D1/D2 SLR, D1/D2   Cues for quad set                Neuro Re-Ed        SAQ    #0 2x10    LAQ  #5 2x10 with focus on top quad set  #0 2x10 with 3\" holds #3 2x10 with focus on top quad set   SLR with brace locked (flex/abd)  2x10 no brace  20x each no brace ---   Prone leg extension  ---      Standing hip abduction/ext w/ TB  YTB 2x10  With RTB 20x with left and 20x with right With RTB 20x with left and 20x with right   Bridges on PB NT secondary to LBP 20x  20x 20x   SLS on airex  With cone taps 2x10 30\" x 3                      Ther Ex        TRX squats 20x 0-60 20x      TRX lunges  10x R/L      Resisted walking #15 retro 10 laps #15 retro 10 laps      Step taps        FSU 6\" 15x 8\" 15x with  4\" x10  6\" x10 10x 6\" 10x 6\"   Touch downs  6\" 10x      Step forwards 4\" 10x controlled descent 6\" 10x controlled " "descent   10x 4\"   STS From 21\" 2x10 no UE support From 21\" 2x10 no UE support 10x from hi-low with brace at 60* TRX mini squats with brace 10x TRX mini squats with brace 10x   Heel slides with PT to ROM per protocol Through full available range 10x with OP       Heel raises   20x at bar 20x at bar 20x at bar with quad set   Quad set        Hamstring curl with PB 20x 20x  20x    Gait                                Ther Activity                        Gait Training                        Modalities        Ice  10 min                  Access Code: [email or web address]  URL: [email or web address]  Date: 04/18/2023  Prepared by: Aniket Turcios    Exercises  - Supine Quadricep Sets  - 1 x daily - 7 x weekly - 2 sets - 10 reps - 5 secs hold  - Gluteal Sets  - 1 x daily - 7 x weekly - 2 sets - 10 reps - 5 secs hold  - Supine Single Leg Ankle Pumps  - 1 x daily - 7 x weekly - 2 sets - 10 reps  - Active Straight Leg Raise with Quad Set  - 1 x daily - 7 x weekly - 2 sets - 10 reps  - Standing Hip Abduction with Counter Support  - 1 x daily - 7 x weekly - 2 sets - 10 reps          "

## 2023-06-30 ENCOUNTER — APPOINTMENT (OUTPATIENT)
Dept: PHYSICAL THERAPY | Facility: CLINIC | Age: 63
End: 2023-06-30
Payer: COMMERCIAL

## 2023-06-30 ENCOUNTER — TELEPHONE (OUTPATIENT)
Dept: PAIN MEDICINE | Facility: CLINIC | Age: 63
End: 2023-06-30

## 2023-07-03 ENCOUNTER — APPOINTMENT (OUTPATIENT)
Dept: PHYSICAL THERAPY | Facility: CLINIC | Age: 63
End: 2023-07-03
Payer: COMMERCIAL

## 2023-07-05 ENCOUNTER — OFFICE VISIT (OUTPATIENT)
Dept: PHYSICAL THERAPY | Facility: CLINIC | Age: 63
End: 2023-07-05
Payer: COMMERCIAL

## 2023-07-05 DIAGNOSIS — S76.112A QUADRICEPS TENDON RUPTURE, LEFT, INITIAL ENCOUNTER: Primary | ICD-10-CM

## 2023-07-05 PROCEDURE — 97112 NEUROMUSCULAR REEDUCATION: CPT | Performed by: PHYSICAL MEDICINE & REHABILITATION

## 2023-07-05 PROCEDURE — 97110 THERAPEUTIC EXERCISES: CPT | Performed by: PHYSICAL MEDICINE & REHABILITATION

## 2023-07-05 NOTE — PROGRESS NOTES
Daily Note     Today's date: 2023  Patient name: Andra Arceo  : 1960  MRN: [id number]  Referring provider: Jace Peña MD  Dx:   Encounter Diagnosis     ICD-10-CM    1. Quadriceps tendon rupture, left, initial encounter  Z42.458K                      Subjective: Patient reports that he has returned to work, and states fatigueabiluty however no pain in knee. Patient notes compliance with HEP. Objective: See treatment diary below      Assessment: Tolerated treatment well. Patient continually challenged with exercise progressions. Patient demonstrated fatigue post treatment, exhibited good technique with therapeutic exercises and would benefit from continued PT      Plan: Continue per plan of care.       Precautions:   Past Medical History:   Diagnosis Date   • Hyperlipidemia    • Shoulder abrasion     injured at work  23     See attached protocol: Quadriceps tendon repair    DOS: 2023    Date: 2023   Visit # 12 17 18 19 15   Manuals        PROM as per protocol TC TC TC 0 - 135 TC (0 - 135) TC (0-135)   MRE SLR, D1/D2 SLR, D1/D2 SLR, D1/D2  SLR, D1/D2   Cues for quad set                Neuro Re-Ed        SAQ        LAQ  #5 2x10 with focus on top quad set  #5 2x10 with focus on top quad set #3 2x10 with focus on top quad set   SLR with brace locked (flex/abd)  2x10 no brace   ---   Prone leg extension  ---      Standing hip abduction/ext w/ TB  YTB 2x10   With RTB 20x with left and 20x with right   Bridges on PB NT secondary to LBP 20x   20x   SLS on airex  With cone taps 2x10 30" x 3  Slight bend in knee with toe taps 20x                    Ther Ex        TRX squats 20x 0-60 20x  20x    TRX lunges  10x R/L  10x R/L    Resisted walking #15 retro 10 laps #15 retro 10 laps  #25 retro    Step taps        FSU 6" 15x 8" 15x with  4" x10  6" x10 10x 6"  10x 8" 10x 6"   Touch downs  6" 10x  6" x 20    Step forwards 4" 10x controlled descent 6" 10x controlled descent  6" 10x controlled descent 10x 4"   STS From 21" 2x10 no UE support From 21" 2x10 no UE support 10x from hi-low with brace at 60* 20x from 19" TRX mini squats with brace 10x   Total gym squats    Single leg 10x from 22*    Heel slides with PT to ROM per protocol Through full available range 10x with OP       Heel raises   20x at bar  20x at bar with quad set   Quad set        Hamstring curl with PB 20x 20x      Gait                                Ther Activity                        Gait Training                        Modalities        Ice  10 min                  Access Code: [email or web address]  URL: [email or web address]  Date: 04/18/2023  Prepared by: Yaneth Engel    Exercises  - Supine Quadricep Sets  - 1 x daily - 7 x weekly - 2 sets - 10 reps - 5 secs hold  - Gluteal Sets  - 1 x daily - 7 x weekly - 2 sets - 10 reps - 5 secs hold  - Supine Single Leg Ankle Pumps  - 1 x daily - 7 x weekly - 2 sets - 10 reps  - Active Straight Leg Raise with Quad Set  - 1 x daily - 7 x weekly - 2 sets - 10 reps  - Standing Hip Abduction with Counter Support  - 1 x daily - 7 x weekly - 2 sets - 10 reps

## 2023-07-10 ENCOUNTER — APPOINTMENT (OUTPATIENT)
Dept: PHYSICAL THERAPY | Facility: CLINIC | Age: 63
End: 2023-07-10
Payer: COMMERCIAL

## 2023-07-12 ENCOUNTER — APPOINTMENT (OUTPATIENT)
Dept: PHYSICAL THERAPY | Facility: CLINIC | Age: 63
End: 2023-07-12
Payer: COMMERCIAL

## 2023-07-27 ENCOUNTER — OFFICE VISIT (OUTPATIENT)
Dept: OBGYN CLINIC | Facility: CLINIC | Age: 63
End: 2023-07-27
Payer: COMMERCIAL

## 2023-07-27 VITALS — BODY MASS INDEX: 29.99 KG/M2 | HEIGHT: 70 IN

## 2023-07-27 DIAGNOSIS — S76.112A QUADRICEPS TENDON RUPTURE, LEFT, INITIAL ENCOUNTER: Primary | ICD-10-CM

## 2023-07-27 PROCEDURE — 99213 OFFICE O/P EST LOW 20 MIN: CPT | Performed by: ORTHOPAEDIC SURGERY

## 2023-07-27 NOTE — PROGRESS NOTES
Orthopaedics Office Visit - follow up Patient Visit    ASSESSMENT/PLAN:    Assessment:   Appx 4 months status post s/p left quadriceps tendon repair   DOS 3/31/23   Doing well  No extensor lag, mild residual quad weakness    Plan:   Patient doing well  He will continue with therapy and add weights  Patient will continue on light duty restrictions  Follow up in 2 months for repeat evaluation. We may consider returning to work full duty at that time. To Do Next Visit:  Repeat evaluation and consider RTW full duty. _____________________________________________________  CHIEF COMPLAINT:  Chief Complaint   Patient presents with   • Left Leg - Follow-up         SUBJECTIVE:  Edouard Reyes is a 66 y.o. male who presents to the office today for follow up evaluation  appx 4 months status post s/p left quadriceps tendon repair   DOS 3/31/23. The patient states he is doing well. He notes an occ ache. He has been attending therapy but has been off the last two weeks due to his therapist being on vacation. He returns to therapy on Monday. He has been compliant with HEP. The patient has returned to work light duty. PAST MEDICAL HISTORY:  Past Medical History:   Diagnosis Date   • Hyperlipidemia    • Shoulder abrasion     injured at work  11/6/21       PAST SURGICAL HISTORY:  Past Surgical History:   Procedure Laterality Date   • CARDIAC SURGERY      had a cardiac cath done 5/2021 at [facility]- showed " slight " blockage   • EPIDURAL BLOCK INJECTION Left 6/23/2023    Procedure: L5-S1, S1 TRANSFORAMINAL epidural steroid injection ([id number]);   Surgeon: Malou Anne MD;  Location: [facility] MAIN OR;  Service: Pain Management    • MOHS SURGERY      basal cell ca of nose   • MOUTH SURGERY     • NERVE BLOCK Right 10/14/2016    Procedure: LUMBAR TRANSFORAMINAL EPIDURAL L4 AND L5;  Surgeon: Char Black MD;  Location: [address] [place] [address] MAIN OR;  Service:    • [place] SURGICAL ARTHROSCOPY SHOULDER W/ROTATOR CUFF RPR Right 11/18/2021 Procedure: REPAIR ROTATOR CUFF  ARTHROSCOPIC, BICEPS TENODESIS, SUBACROMIAL DECOMPRESSION;  Surgeon: Lanette Mcguire MD;  Location: [facility];  Service: Orthopedics   • TN SUTURE QUADRICEPS/HAMSTRING RUPTURE PRIMARY Left 3/31/2023    Procedure: REPAIR TENDON LEFT QUADRICEPS;  Surgeon: Jenniffer Rodriguez MD;  Location: [facility];  Service: Orthopedics       FAMILY HISTORY:  History reviewed. No pertinent family history. SOCIAL HISTORY:  Social History     Tobacco Use   • Smoking status: Former   • Smokeless tobacco: Current   • Tobacco comments:     Vape   Substance Use Topics   • Alcohol use: No   • Drug use: No       MEDICATIONS:    Current Outpatient Medications:   •  multivitamin-iron-minerals-folic acid (CENTRUM) chewable tablet, Chew 1 tablet daily, Disp: , Rfl:   •  naproxen (EC NAPROSYN) 500 MG EC tablet, Take 1 tablet (500 mg total) by mouth 2 (two) times a day with meals, Disp: 60 tablet, Rfl: 0  •  rosuvastatin (CRESTOR) 20 MG tablet, Take 20 mg by mouth daily at bedtime, Disp: , Rfl:   •  aspirin 81 mg chewable tablet, Chew 81 mg daily, Disp: , Rfl:   •  oxyCODONE (Roxicodone) 5 immediate release tablet, Take 1 tablet (5 mg total) by mouth every 12 (twelve) hours as needed for moderate pain Max Daily Amount: 10 mg, Disp: 15 tablet, Rfl: 0  •  RA Aspirin EC 81 MG EC tablet, Take 1 tablet (81 mg total) by mouth every 12 (twelve) hours for 28 days Last dose 3/30/23- ok with surgeon, Disp: 56 tablet, Rfl: 0    ALLERGIES:  No Known Allergies    REVIEW OF SYSTEMS:  MSK: as noted in HPI  Neuro: WNL  Pertinent items are otherwise noted in HPI. A comprehensive review of systems was otherwise negative.     LABS:  HgA1c: No results found for: "HGBA1C"  BMP:   Lab Results   Component Value Date    CALCIUM 8.8 03/29/2023    K 4.6 03/29/2023    CO2 27 03/29/2023     03/29/2023    BUN 18 03/29/2023    CREATININE 0.91 03/29/2023     CBC: No components found for: "CBC"    _____________________________________________________  PHYSICAL EXAMINATION:  Vital signs: Ht 5' 10" (1.778 m)   BMI 29.99 kg/m²   General: No acute distress, awake and alert  Psychiatric: Mood and affect appear appropriate  HEENT: Trachea Midline, No torticollis, no apparent facial trauma  Cardiovascular: No audible murmurs; Extremities appear perfused  Pulmonary: No audible wheezing or stridor  Skin: No open lesions; see further details (if any) below    MUSCULOSKELETAL EXAMINATION:  Extremities:  Left knee  Able to straight leg raise  Weakness with quadriceps  NVI distally       _____________________________________________________  STUDIES REVIEWED:  I personally reviewed the images and interpretation is as follows: no new images reviewed.        PROCEDURES PERFORMED:  Non performed     Scribe Attestation    I,:  Cassie Mars MA am acting as a scribe while in the presence of the attending physician.:       I,:  Mark Andrews MD personally performed the services described in this documentation    as scribed in my presence.:

## 2023-07-31 ENCOUNTER — OFFICE VISIT (OUTPATIENT)
Dept: PHYSICAL THERAPY | Facility: CLINIC | Age: 63
End: 2023-07-31
Payer: COMMERCIAL

## 2023-07-31 DIAGNOSIS — S76.112A QUADRICEPS TENDON RUPTURE, LEFT, INITIAL ENCOUNTER: Primary | ICD-10-CM

## 2023-07-31 PROCEDURE — 97110 THERAPEUTIC EXERCISES: CPT | Performed by: PHYSICAL MEDICINE & REHABILITATION

## 2023-07-31 PROCEDURE — 97112 NEUROMUSCULAR REEDUCATION: CPT | Performed by: PHYSICAL MEDICINE & REHABILITATION

## 2023-07-31 NOTE — PROGRESS NOTES
Daily Note     Today's date: 2023  Patient name: Meredith Pham  : 1960  MRN: [id number]  Referring provider: Akosua Espinal MD  Dx:   Encounter Diagnosis     ICD-10-CM    1. Quadriceps tendon rupture, left, initial encounter  H31.716A                      Subjective: Patient reports he has been persistent with his HEP. Noting that he also had a follow up with his MD who was pleased with his progress thus far. Objective: See treatment diary below      Assessment: Tolerated treatment well. Patient continually challenged with exercise progressions, exhibiting quad fatigue. Patient demonstrated fatigue post treatment, exhibited good technique with therapeutic exercises and would benefit from continued PT      Plan: Continue per plan of care.       Precautions:   Past Medical History:   Diagnosis Date   • Hyperlipidemia    • Shoulder abrasion     injured at work  23     See attached protocol: Quadriceps tendon repair    DOS: 2023    Date: 2023   Visit # 12 17 25 19 20   Manuals        PROM as per protocol TC TC TC 0 - 135 TC (0 - 135) TC (0-135)   MRE SLR, D1/D2 SLR, D1/D2 SLR, D1/D2     Cues for quad set                Neuro Re-Ed        SAQ        LAQ  #5 2x10 with focus on top quad set  #5 2x10 with focus on top quad set    SLR with brace locked (flex/abd)  2x10 no brace   #3 2x10 with cues for quad set   Prone leg extension  ---      Standing hip abduction/ext w/ TB  YTB 2x10   With RTB 20x with left and 20x with right   Bridges on PB NT secondary to LBP 20x      SLS on airex  With cone taps 2x10 30" x 3  Slight bend in knee with toe taps 20x Slight bend in knee with toe taps 20x                   Ther Ex        TRX squats 20x 0-60 20x  20x 20x   TRX lunges  10x R/L  10x R/L 20x R/L   Resisted walking #15 retro 10 laps #15 retro 10 laps  #25 retro    Step taps        FSU 6" 15x 8" 15x with  4" x10  6" x10 10x 6"  10x 8" 10x 8"   Touch downs  6" 10x  6" x 20 8" x 20   Step forwards 4" 10x controlled descent 6" 10x controlled descent  6" 10x controlled descent 10x 4"   STS From 21" 2x10 no UE support From 21" 2x10 no UE support 10x from hi-low with brace at 60* 20x from 19" From chair with #30   Total gym squats        Heel slides with PT to ROM per protocol Through full available range 10x with OP       Heel raises   20x at bar     Quad set        Hamstring curl with PB 20x 20x      Gait                                Ther Activity                        Gait Training                        Modalities        Ice  10 min                  Access Code: [email or web address]  URL: [email or web address]  Date: 04/18/2023  Prepared by: Lea Hooker    Exercises  - Supine Quadricep Sets  - 1 x daily - 7 x weekly - 2 sets - 10 reps - 5 secs hold  - Gluteal Sets  - 1 x daily - 7 x weekly - 2 sets - 10 reps - 5 secs hold  - Supine Single Leg Ankle Pumps  - 1 x daily - 7 x weekly - 2 sets - 10 reps  - Active Straight Leg Raise with Quad Set  - 1 x daily - 7 x weekly - 2 sets - 10 reps  - Standing Hip Abduction with Counter Support  - 1 x daily - 7 x weekly - 2 sets - 10 reps

## 2023-08-08 ENCOUNTER — OFFICE VISIT (OUTPATIENT)
Dept: PHYSICAL THERAPY | Facility: CLINIC | Age: 63
End: 2023-08-08
Payer: COMMERCIAL

## 2023-08-08 DIAGNOSIS — S76.112A QUADRICEPS TENDON RUPTURE, LEFT, INITIAL ENCOUNTER: Primary | ICD-10-CM

## 2023-08-08 PROCEDURE — 97112 NEUROMUSCULAR REEDUCATION: CPT | Performed by: PHYSICAL MEDICINE & REHABILITATION

## 2023-08-08 PROCEDURE — 97110 THERAPEUTIC EXERCISES: CPT | Performed by: PHYSICAL MEDICINE & REHABILITATION

## 2023-08-08 NOTE — PROGRESS NOTES
Daily Note     Today's date: 2023  Patient name: Dave Obregon  : 1960  MRN: [id number]  Referring provider: Lele Cooper MD  Dx:   Encounter Diagnosis     ICD-10-CM    1. Quadriceps tendon rupture, left, initial encounter  Q56.590Y                      Subjective: Patient reports he has continued to be compliant with progressive HEP. Objective: See treatment diary below      Assessment: Tolerated treatment well. Patient demonstrated fatigue post treatment, exhibited good technique with therapeutic exercises and would benefit from continued PT      Plan: Continue per plan of care.       Precautions:   Past Medical History:   Diagnosis Date   • Hyperlipidemia    • Shoulder abrasion     injured at work  23     See attached protocol: Quadriceps tendon repair    DOS: 2023    Date: 2023   Visit # 21 16 18 23 20   Manuals        PROM as per protocol  TC TC 0 - 135 TC (0 - 135) TC (0-135)   MRE  SLR, D1/D2 SLR, D1/D2     Cues for quad set                Neuro Re-Ed        SAQ        LAQ #7.5 2x10 #5 2x10 with focus on top quad set  #5 2x10 with focus on top quad set    SLR with brace locked (flex/abd)  2x10 no brace   #3 2x10 with cues for quad set   Prone leg extension  ---      Standing hip abduction/ext w/ TB  YTB 2x10   With RTB 20x with left and 20x with right   Bridges on PB 20x 20x      SLS on airex  With cone taps 2x10 30" x 3  Slight bend in knee with toe taps 20x Slight bend in knee with toe taps 20x                   Ther Ex        TRX squats 30x 20x  20x 20x   TRX lunges 20x R/L 10x R/L  10x R/L 20x R/L   Resisted walking  #15 retro 10 laps  #25 retro    Step taps        FSU 8" 2x10 8" 15x with  4" x10  6" x10 10x 6"  10x 8" 10x 8"   Touch downs 8" 2x10 6" 10x  6" x 20 8" x 20   Step forwards 6" 10x controlled descent 6" 10x controlled descent  6" 10x controlled descent 10x 4"   STS From chair with #30 From 21" 2x10 no UE support 10x from hi-low with brace at 60* 20x from 19" From chair with #30   Total gym squats        Heel slides with PT to ROM per protocol        Heel raises   20x at bar     Quad set        Hamstring curl with PB  20x      Gait        Retro walking on TM 3'                       Ther Activity                        Gait Training                        Modalities        Ice  10 min                  Access Code: [email or web address]  URL: [email or web address]  Date: 04/18/2023  Prepared by: Cal Arguello    Exercises  - Supine Quadricep Sets  - 1 x daily - 7 x weekly - 2 sets - 10 reps - 5 secs hold  - Gluteal Sets  - 1 x daily - 7 x weekly - 2 sets - 10 reps - 5 secs hold  - Supine Single Leg Ankle Pumps  - 1 x daily - 7 x weekly - 2 sets - 10 reps  - Active Straight Leg Raise with Quad Set  - 1 x daily - 7 x weekly - 2 sets - 10 reps  - Standing Hip Abduction with Counter Support  - 1 x daily - 7 x weekly - 2 sets - 10 reps

## 2023-08-15 ENCOUNTER — OFFICE VISIT (OUTPATIENT)
Dept: PHYSICAL THERAPY | Facility: CLINIC | Age: 63
End: 2023-08-15
Payer: COMMERCIAL

## 2023-08-15 DIAGNOSIS — S76.112A QUADRICEPS TENDON RUPTURE, LEFT, INITIAL ENCOUNTER: Primary | ICD-10-CM

## 2023-08-15 PROCEDURE — 97110 THERAPEUTIC EXERCISES: CPT | Performed by: PHYSICAL MEDICINE & REHABILITATION

## 2023-08-15 NOTE — PROGRESS NOTES
Daily Note     Today's date: 8/15/2023  Patient name: Rosemarie Alonso  : 1960  MRN: [id number]  Referring provider: Jacinto Caballero MD  Dx:   Encounter Diagnosis     ICD-10-CM    1. Quadriceps tendon rupture, left, initial encounter  Z21.348T                      Subjective: Patient reports he has continued to work on isolated strength stating he is still challenged however notes he feels he is getting stronger. Objective: See treatment diary below      Assessment: Tolerated treatment well. Patient demonstrated fatigue post treatment, exhibited good technique with therapeutic exercises and would benefit from continued PT      Plan: Continue per plan of care.       Precautions:   Past Medical History:   Diagnosis Date   • Hyperlipidemia    • Shoulder abrasion     injured at work  23     See attached protocol: Quadriceps tendon repair    DOS: 2023    Date: 2023 08/15/2023 2023 2023 2023   Visit # 21 25 18 23 20   Manuals        PROM as per protocol   TC 0 - 135 TC (0 - 135) TC (0-135)   MRE   SLR, D1/D2     Cues for quad set                Neuro Re-Ed        SAQ        LAQ #7.5 2x10 #7.5 2x10  #5 2x10 with focus on top quad set    SLR with brace locked (flex/abd)  2x10 no brace   #3 2x10 with cues for quad set   Prone leg extension  ---      Standing hip abduction/ext w/ TB  YTB 2x10   With RTB 20x with left and 20x with right   Bridges on PB 20x 20x      SLS on airex  With cone taps 2x10 on bosu 30" x 3  Slight bend in knee with toe taps 20x Slight bend in knee with toe taps 20x                   Ther Ex        TRX squats 30x 30x  20x 20x   TRX lunges 20x R/L 20x R/L  10x R/L 20x R/L   Resisted walking  #15 retro 10 laps  #25 retro    Step taps        FSU 8" 2x10 8" 15x with  4" x10  6" x10 10x 6"  10x 8" 10x 8"   Touch downs 8" 2x10 6" 10x  6" x 20 8" x 20   Step forwards 6" 10x controlled descent 6" 10x controlled descent  6" 10x controlled descent 10x 4"   STS From chair with #30 From 21" 2x10 no UE support 10x from hi-low with brace at 60* 20x from 19" From chair with #30   Total gym squats        Heel slides with PT to ROM per protocol        Heel raises   20x at bar     Quad set        Hamstring curl with PB  20x      Gait        Retro walking on TM 3'                       Ther Activity                        Gait Training                        Modalities        Ice  10 min                  Access Code: [email or web address]  URL: [email or web address]  Date: 04/18/2023  Prepared by: Mary Montoya    Exercises  - Supine Quadricep Sets  - 1 x daily - 7 x weekly - 2 sets - 10 reps - 5 secs hold  - Gluteal Sets  - 1 x daily - 7 x weekly - 2 sets - 10 reps - 5 secs hold  - Supine Single Leg Ankle Pumps  - 1 x daily - 7 x weekly - 2 sets - 10 reps  - Active Straight Leg Raise with Quad Set  - 1 x daily - 7 x weekly - 2 sets - 10 reps  - Standing Hip Abduction with Counter Support  - 1 x daily - 7 x weekly - 2 sets - 10 reps

## 2023-08-23 ENCOUNTER — APPOINTMENT (OUTPATIENT)
Dept: PHYSICAL THERAPY | Facility: CLINIC | Age: 63
End: 2023-08-23
Payer: COMMERCIAL

## 2023-08-28 ENCOUNTER — APPOINTMENT (OUTPATIENT)
Dept: PHYSICAL THERAPY | Facility: CLINIC | Age: 63
End: 2023-08-28
Payer: COMMERCIAL

## 2023-09-07 NOTE — PROGRESS NOTES
Assessment/Plan:  1  Status post right rotator cuff repair       The patient is making progress with his shoulder, and has improving range of motion strength on examination today  I did review his physical therapy notes which demonstrate ongoing progress as well  We did discuss his nighttime pain  I think this is likely due to his sleeping position  I discussed this sleeping on his stomach with his arms over his head is quite stressful in the shoulder  I think back or side sleeping would be much better for him  He will attempt to make this change  Will continue physical therapy on the rotator cuff repair and biceps tenodesis protocol  He will follow up in 6 weeks for repeat evaluation  He hopes to return to riding his motorcycle at that time  Subjective:   Eric Hay is a 63 y o  male who presents today for follow-up of his right shoulder, now about 3 months status post arthroscopic rotator cuff repair and biceps tenodesis  Feels he is making progress with physical therapy and has minimal pain during the day  He does note discomfort about the shoulder night, which continues to wake him up  He is sleeping on his stomach with his arms over his head  He notes improving range of motion and strength, but still complains of weakness of the shoulder  He notes good sensation of the right upper extremity  Review of Systems   Constitutional: Negative  Negative for chills and fever  HENT: Negative  Negative for ear pain and sore throat  Eyes: Negative  Negative for pain and redness  Respiratory: Negative  Negative for shortness of breath and wheezing  Cardiovascular: Negative for chest pain and palpitations  Gastrointestinal: Negative  Negative for abdominal pain and blood in stool  Endocrine: Negative  Negative for polydipsia and polyuria  Genitourinary: Negative  Negative for difficulty urinating and dysuria  Musculoskeletal:        As noted in HPI   Skin: Negative  Hx of fractured nose and separation from bony cartilage. Pt reports recent irritation from allergy symptoms. Avoid intranasal medications. Negative for pallor and rash  Neurological: Negative  Negative for dizziness and numbness  Hematological: Negative  Negative for adenopathy  Does not bruise/bleed easily  Psychiatric/Behavioral: Negative  Negative for confusion and suicidal ideas  Past Medical History:   Diagnosis Date    Hyperlipidemia     Shoulder abrasion     injured at work  11/6/21       Past Surgical History:   Procedure Laterality Date    CARDIAC SURGERY      had [facility]- showed " slight " blockage    MOHS SURGERY      basal cell ca of nose    MOUTH SURGERY      NERVE BLOCK Right 10/14/2016    Procedure: LUMBAR TRANSFORAMINAL EPIDURAL L4 AND L5;  Surgeon: Nemesio Stewart MD;  Location: [facility] MAIN OR;  Service:    [place] [address] [place] ARTHROSCOP,SURG,W/ROTAT CUFF REPR Right 11/18/2021    Procedure: REPAIR ROTATOR CUFF  ARTHROSCOPIC, BICEPS TENODESIS, SUBACROMIAL DECOMPRESSION;  Surgeon: Juan Alberto Rust MD;  Location: [address] [place] [address];  Service: Orthopedics       History reviewed  No pertinent family history      Social History     Occupational History    Not on file   Tobacco Use    Smoking status: Former Smoker    Smokeless tobacco: Current User    Tobacco comment: Vape   Substance and Sexual Activity    Alcohol use: No    Drug use: No    Sexual activity: Not on file         Current Outpatient Medications:     acetaminophen (TYLENOL) 325 mg tablet, Take 650 mg by mouth every 6 (six) hours as needed for mild pain, Disp: , Rfl:     methocarbamol (ROBAXIN) 750 mg tablet, Take 750 mg by mouth as needed, Disp: , Rfl:     multivitamin-iron-minerals-folic acid (CENTRUM) chewable tablet, Chew 1 tablet daily, Disp: , Rfl:     naproxen (NAPROSYN) 500 mg tablet, Take 1 tablet (500 mg total) by mouth 2 (two) times a day with meals, Disp: 60 tablet, Rfl: 0    RA Aspirin EC 81 MG EC tablet, Take 81 mg by mouth daily Last dose 11/9/21 , Disp: , Rfl:     rosuvastatin (CRESTOR) 20 MG tablet, Take 20 mg by mouth daily at bedtime, Disp: , Rfl:     No Known Allergies    Objective:  Vitals:    02/21/22 0819   BP: 149/66   Pulse: 92       Right Shoulder Exam     Tenderness   The patient is experiencing no tenderness  Range of Motion   Active abduction: 160   External rotation: 50   Internal rotation 0 degrees: L5     Muscle Strength   Abduction: 4/5   Internal rotation: 5/5   External rotation: 4/5     Tests   Drop arm: negative    Other   Erythema: absent  Scars: present  Sensation: normal  Pulse: present            Physical Exam  Constitutional:       General: He is not in acute distress  Appearance: He is well-developed  HENT:      Head: Normocephalic and atraumatic  Eyes:      General: No scleral icterus  Conjunctiva/sclera: Conjunctivae normal    Neck:      Vascular: No JVD  Cardiovascular:      Rate and Rhythm: Normal rate  Pulmonary:      Effort: Pulmonary effort is normal  No respiratory distress  Skin:     General: Skin is warm  Neurological:      Mental Status: He is alert and oriented to person, place, and time        Coordination: Coordination normal

## 2023-09-21 ENCOUNTER — OFFICE VISIT (OUTPATIENT)
Dept: OBGYN CLINIC | Facility: CLINIC | Age: 63
End: 2023-09-21
Payer: COMMERCIAL

## 2023-09-21 VITALS
HEART RATE: 76 BPM | SYSTOLIC BLOOD PRESSURE: 148 MMHG | BODY MASS INDEX: 29.96 KG/M2 | DIASTOLIC BLOOD PRESSURE: 73 MMHG | WEIGHT: 209.3 LBS | HEIGHT: 70 IN

## 2023-09-21 DIAGNOSIS — M54.16 ACUTE RIGHT LUMBAR RADICULOPATHY: Primary | ICD-10-CM

## 2023-09-21 DIAGNOSIS — S76.112A QUADRICEPS TENDON RUPTURE, LEFT, INITIAL ENCOUNTER: ICD-10-CM

## 2023-09-21 PROCEDURE — 99213 OFFICE O/P EST LOW 20 MIN: CPT | Performed by: ORTHOPAEDIC SURGERY

## 2023-09-21 NOTE — PATIENT INSTRUCTIONS
- Weight bearing as tolerated left lower extremity   - Continue home exercise program as directed   - Over the counter analgesics as needed / directed   - Ice / heat as directed   - Follow up PRN

## 2023-09-21 NOTE — PROGRESS NOTES
Orthopaedics Office Visit - Post-op Patient Visit    ASSESSMENT/PLAN:    Assessment:   6 months s/p left quadriceps tendon repair   DOS 3/31/23   Progressing well overall  Mild residual quad weakness/soreness after working out  Scar tissue anterior patella    History of treatments with spine/pain team, L sided injections    Plan:   - Weight bearing as tolerated left lower extremity   - Continue home exercise program as directed - focus on quad strengthening  - Over the counter analgesics as needed / directed   - Ice / heat as directed   - Follow up 4 months      To Do Next Visit:  4 months  _____________________________________________________  CHIEF COMPLAINT:  Chief Complaint   Patient presents with   • Left Leg - Follow-up         SUBJECTIVE:  Teresa Cazares is a 66 y.o. male who presents  6 months s/p left quadriceps tendon repair   DOS 3/31/23. Patient states that his knee is doing well overall. Patient states that he does experience anterior knee pain associated with prolonged activity. Patient is returned to most physical activity. Patient states he does not take any other pain medication currently. Patient states that his main concern is that he has had episodes of hypersensitivity in his left hip. Patient does have a history of lumbar radiculopathy which was previously treated with MAMIE. Patient offers no other complaints at this time.       SOCIAL HISTORY:  Social History     Tobacco Use   • Smoking status: Former   • Smokeless tobacco: Current   • Tobacco comments:     Vape   Substance Use Topics   • Alcohol use: No   • Drug use: No       MEDICATIONS:    Current Outpatient Medications:   •  multivitamin-iron-minerals-folic acid (CENTRUM) chewable tablet, Chew 1 tablet daily, Disp: , Rfl:   •  naproxen (EC NAPROSYN) 500 MG EC tablet, Take 1 tablet (500 mg total) by mouth 2 (two) times a day with meals, Disp: 60 tablet, Rfl: 0  •  rosuvastatin (CRESTOR) 20 MG tablet, Take 20 mg by mouth daily at bedtime, Disp: , Rfl:   •  aspirin 81 mg chewable tablet, Chew 81 mg daily, Disp: , Rfl:   •  oxyCODONE (Roxicodone) 5 immediate release tablet, Take 1 tablet (5 mg total) by mouth every 12 (twelve) hours as needed for moderate pain Max Daily Amount: 10 mg, Disp: 15 tablet, Rfl: 0  •  RA Aspirin EC 81 MG EC tablet, Take 1 tablet (81 mg total) by mouth every 12 (twelve) hours for 28 days Last dose 3/30/23- ok with surgeon, Disp: 56 tablet, Rfl: 0    REVIEW OF SYSTEMS:  MSK: Left knee pain   Neuro: WNL   Pertinent items are otherwise noted in HPI. A comprehensive review of systems was otherwise negative.    _____________________________________________________  PHYSICAL EXAMINATION:  Vital signs: /73   Pulse 76   Ht 5' 10" (1.778 m)   Wt 94.9 kg (209 lb 4.8 oz)   BMI 30.03 kg/m²   General: No acute distress, awake and alert  Psychiatric: Mood and affect appear appropriate  HEENT: Trachea Midline, No torticollis, no apparent facial trauma  Cardiovascular: No audible murmurs; Extremities appear perfused  Pulmonary: No audible wheezing or stridor  Skin: No open lesions; see further details (if any) below      MUSCULOSKELETAL EXAMINATION:  Left knee examination:  - Patient sitting comfortably in the office in no acute distress   - Healed incision site over the anterior aspect of the left knee. Extremity appears well-perfused overall  - no bony or soft tissue tenderness palpation noted at this time  - full range of motion of the left knee with no pain appreciated  - NV intact    _____________________________________________________  STUDIES REVIEWED:  I personally reviewed the images and interpretation is as follows:  N/A       PROCEDURES PERFORMED:  No procedures were performed at this time. Reji Andino PA-C - assisting  Abhinav Galeana MD                      Portions of the record may have been created with voice recognition software.   Occasional wrong word or "sound a like" substitutions may have occurred due to the inherent limitations of voice recognition software. Read the chart carefully and recognize, using context, where substitutions have occurred.

## (undated) DEVICE — GAUZE SPONGES,USP TYPE VII GAUZE, 12 PLY: Brand: CURITY

## (undated) DEVICE — INTENDED FOR TISSUE SEPARATION, AND OTHER PROCEDURES THAT REQUIRE A SHARP SURGICAL BLADE TO PUNCTURE OR CUT.: Brand: BARD-PARKER SAFETY BLADES SIZE 11, STERILE

## (undated) DEVICE — DUAL SPIKE ADAPTER: Brand: CONMED

## (undated) DEVICE — VAPR COOLPULSE 90 ELECTRODE WITH HAND CONTROLS 90 DEGREES SUCTION WITH INTEGRATED HANDPIECE: Brand: VAPR COOLPULSE

## (undated) DEVICE — GLOVE INDICATOR PI UNDERGLOVE SZ 8 BLUE

## (undated) DEVICE — BANDAGE, ESMARK LF STR 6"X9' (20/CS): Brand: CYPRESS

## (undated) DEVICE — 4.0MM EGG BUR

## (undated) DEVICE — TIBURON BEACH CHAIR SHOULDER DRAPE: Brand: CONVERTORS

## (undated) DEVICE — TRAY PAIN SUPPORT

## (undated) DEVICE — ASTOUND IMPERVIOUS SURGICAL GOWN: Brand: CONVERTORS

## (undated) DEVICE — 3M™ TEGADERM™ TRANSPARENT FILM DRESSING FRAME STYLE, 1626W, 4 IN X 4-3/4 IN (10 CM X 12 CM), 50/CT 4CT/CASE: Brand: 3M™ TEGADERM™

## (undated) DEVICE — IMPERVIOUS STOCKINETTE: Brand: DEROYAL

## (undated) DEVICE — INTENDED FOR TISSUE SEPARATION, AND OTHER PROCEDURES THAT REQUIRE A SHARP SURGICAL BLADE TO PUNCTURE OR CUT.: Brand: BARD-PARKER SAFETY BLADES SIZE 10, STERILE

## (undated) DEVICE — SYRINGE 50ML LL

## (undated) DEVICE — ELECTRODE BLADE MOD E-Z CLEAN 2.5IN 6.4CM -0012M

## (undated) DEVICE — NEEDLE SUT SCORPION MULTIFIRE

## (undated) DEVICE — CANNULA 5.75 X 70MM BARREL SHAPED BOWL

## (undated) DEVICE — SUT VICRYL PLUS 0 CTB-1 27 IN VCPB260H

## (undated) DEVICE — SUT ETHIBOND 5 V-40 30 IN MB46G

## (undated) DEVICE — SYRINGE 5ML LL

## (undated) DEVICE — SUT VICRYL PLUS 2-0 CTB-1 27 IN VCPB259H

## (undated) DEVICE — PADDING CAST 4 IN  COTTON STRL

## (undated) DEVICE — PAD GROUNDING ADULT

## (undated) DEVICE — TOWEL SET X-RAY

## (undated) DEVICE — CHLORAPREP HI-LITE 26ML ORANGE

## (undated) DEVICE — RADIOLOGY STERILE LABELS: Brand: CENTURION

## (undated) DEVICE — WIPES BABY PAMPERS SENSITIVE 36/PK

## (undated) DEVICE — GLOVE UNDER SRG SKINSENSE 7.5

## (undated) DEVICE — FIBERTAPE 2MM X 7IN AR-7237-7

## (undated) DEVICE — CURITY NON-ADHERENT STRIPS: Brand: CURITY

## (undated) DEVICE — PLASTIC ADHESIVE BANDAGE: Brand: CURITY

## (undated) DEVICE — INTENDED FOR TISSUE SEPARATION, AND OTHER PROCEDURES THAT REQUIRE A SHARP SURGICAL BLADE TO PUNCTURE OR CUT.: Brand: BARD-PARKER SAFETY BLADES SIZE 15, STERILE

## (undated) DEVICE — CHLORAPREP APPLICATOR TINTED 10.5ML ONE-STEP

## (undated) DEVICE — BURR  OVAL 4MM 13CM 8 FLUTE COOLCUT

## (undated) DEVICE — OCCLUSIVE GAUZE STRIP,3% BISMUTH TRIBROMOPHENATE IN PETROLATUM BLEND: Brand: XEROFORM

## (undated) DEVICE — NEEDLE BLUNT 18 G X 1 1/2 W FILTER

## (undated) DEVICE — HEWSON SUTURE RETRIEVER: Brand: HEWSON SUTURE RETRIEVER

## (undated) DEVICE — GLOVE SRG BIOGEL ORTHOPEDIC 8

## (undated) DEVICE — PADDING CAST 6IN COTTON STRL

## (undated) DEVICE — PROXIMATE PLUS MD MULTI-DIRECTIONAL RELEASE SKIN STAPLERS CONTAINS 35 STAINLESS STEEL STAPLES APPROXIMATE CLOSED DIMENSIONS: 6.9MM X 3.9MM WIDE: Brand: PROXIMATE

## (undated) DEVICE — POSITIONER TRIMANO LIMB BEACH CHAIR

## (undated) DEVICE — SKIN MARKER DUAL TIP WITH RULER CAP, FLEXIBLE RULER AND LABELS: Brand: DEVON

## (undated) DEVICE — GLOVE SRG BIOGEL 6.5

## (undated) DEVICE — BLADE SHAVER TORPEDO 4MM 13CM  COOLCUT

## (undated) DEVICE — SMALL NEEDLE COUNTER NEST

## (undated) DEVICE — UNIVERSAL MAJOR EXTREMITY,KIT: Brand: CARDINAL HEALTH

## (undated) DEVICE — CANNULA BUTTON 8 X 30MM PASSPORT

## (undated) DEVICE — INTENT TO BE USED WITH SUTURE MATERIAL FOR TISSUE CLOSURE: Brand: RICHARD-ALLAN®  NEEDLE 1/2 CIRCLE REVERSE CUTTING

## (undated) DEVICE — SUT FIBERWIRE #2 1/2 CIRCLE T-5 38IN AR-7200

## (undated) DEVICE — ACE WRAP 6 IN UNSTERILE

## (undated) DEVICE — NEEDLE SPINAL 22G X 5IN QUINCKE

## (undated) DEVICE — GLOVE SRG BIOGEL 7.5

## (undated) DEVICE — GLOVE INDICATOR PI UNDERGLOVE SZ 7.5 BLUE

## (undated) DEVICE — PENCIL ELECTROSURG E-Z CLEAN -0035H

## (undated) DEVICE — ABDOMINAL PAD: Brand: DERMACEA

## (undated) DEVICE — PACK ARTHROSCOPY

## (undated) DEVICE — TUBING SUCTION 5MM X 12 FT

## (undated) DEVICE — GLOVE INDICATOR PI UNDERGLOVE SZ 6.5 BLUE

## (undated) DEVICE — TUBING ARTHROSCOPIC WAVE  MAIN PUMP